# Patient Record
Sex: MALE | Race: WHITE | Employment: OTHER | URBAN - NONMETROPOLITAN AREA
[De-identification: names, ages, dates, MRNs, and addresses within clinical notes are randomized per-mention and may not be internally consistent; named-entity substitution may affect disease eponyms.]

---

## 2017-02-21 ENCOUNTER — TELEPHONE (OUTPATIENT)
Dept: NEUROLOGY | Age: 77
End: 2017-02-21

## 2017-03-15 ENCOUNTER — TELEPHONE (OUTPATIENT)
Dept: NEUROLOGY | Age: 77
End: 2017-03-15

## 2017-04-03 ENCOUNTER — OFFICE VISIT (OUTPATIENT)
Dept: NEUROLOGY | Age: 77
End: 2017-04-03
Payer: MEDICARE

## 2017-04-03 VITALS
RESPIRATION RATE: 16 BRPM | HEIGHT: 68 IN | SYSTOLIC BLOOD PRESSURE: 114 MMHG | WEIGHT: 151 LBS | DIASTOLIC BLOOD PRESSURE: 71 MMHG | HEART RATE: 60 BPM | BODY MASS INDEX: 22.88 KG/M2

## 2017-04-03 DIAGNOSIS — F02.818 LATE ONSET ALZHEIMER'S DISEASE WITH BEHAVIORAL DISTURBANCE (HCC): Primary | ICD-10-CM

## 2017-04-03 DIAGNOSIS — G30.1 LATE ONSET ALZHEIMER'S DISEASE WITH BEHAVIORAL DISTURBANCE (HCC): Primary | ICD-10-CM

## 2017-04-03 DIAGNOSIS — G20 PARKINSON DISEASE (HCC): ICD-10-CM

## 2017-04-03 DIAGNOSIS — M79.605 PAIN IN BOTH LOWER EXTREMITIES: ICD-10-CM

## 2017-04-03 DIAGNOSIS — M79.604 PAIN IN BOTH LOWER EXTREMITIES: ICD-10-CM

## 2017-04-03 PROCEDURE — 99214 OFFICE O/P EST MOD 30 MIN: CPT | Performed by: PSYCHIATRY & NEUROLOGY

## 2017-04-03 RX ORDER — PAROXETINE HYDROCHLORIDE 20 MG/1
20 TABLET, FILM COATED ORAL DAILY
Qty: 30 TABLET | Refills: 2 | Status: SHIPPED | OUTPATIENT
Start: 2017-04-03

## 2017-04-03 RX ORDER — FENTANYL 12 UG/H
1 PATCH TRANSDERMAL
COMMUNITY
End: 2018-05-08 | Stop reason: CLARIF

## 2017-04-17 RX ORDER — MEMANTINE HYDROCHLORIDE 10 MG/1
TABLET ORAL
Qty: 60 TABLET | Refills: 4 | Status: SHIPPED | OUTPATIENT
Start: 2017-04-17 | End: 2017-09-15 | Stop reason: SDUPTHER

## 2017-09-15 RX ORDER — MEMANTINE HYDROCHLORIDE 10 MG/1
TABLET ORAL
Qty: 60 TABLET | Refills: 11 | Status: SHIPPED | OUTPATIENT
Start: 2017-09-15 | End: 2018-10-15 | Stop reason: SDUPTHER

## 2018-01-28 ENCOUNTER — APPOINTMENT (OUTPATIENT)
Dept: CT IMAGING | Facility: HOSPITAL | Age: 78
End: 2018-01-28

## 2018-01-28 ENCOUNTER — APPOINTMENT (OUTPATIENT)
Dept: GENERAL RADIOLOGY | Facility: HOSPITAL | Age: 78
End: 2018-01-28

## 2018-01-28 ENCOUNTER — HOSPITAL ENCOUNTER (EMERGENCY)
Facility: HOSPITAL | Age: 78
Discharge: HOME | End: 2018-01-28
Attending: EMERGENCY MEDICINE | Admitting: EMERGENCY MEDICINE

## 2018-01-28 VITALS
SYSTOLIC BLOOD PRESSURE: 112 MMHG | HEART RATE: 90 BPM | BODY MASS INDEX: 22.73 KG/M2 | HEIGHT: 68 IN | OXYGEN SATURATION: 100 % | DIASTOLIC BLOOD PRESSURE: 70 MMHG | RESPIRATION RATE: 16 BRPM | WEIGHT: 150 LBS | TEMPERATURE: 98 F

## 2018-01-28 DIAGNOSIS — E86.0 DEHYDRATION: Primary | ICD-10-CM

## 2018-01-28 LAB
ALBUMIN SERPL-MCNC: 4.1 G/DL (ref 3.5–5)
ALBUMIN/GLOB SERPL: 1.4 G/DL (ref 1.1–2.5)
ALP SERPL-CCNC: 78 U/L (ref 24–120)
ALT SERPL W P-5'-P-CCNC: <15 U/L (ref 0–54)
ANION GAP SERPL CALCULATED.3IONS-SCNC: 12 MMOL/L (ref 4–13)
AST SERPL-CCNC: 19 U/L (ref 7–45)
BASOPHILS # BLD AUTO: 0.01 10*3/MM3 (ref 0–0.2)
BASOPHILS NFR BLD AUTO: 0.2 % (ref 0–2)
BILIRUB SERPL-MCNC: 0.5 MG/DL (ref 0.1–1)
BILIRUB UR QL STRIP: NEGATIVE
BUN BLD-MCNC: 23 MG/DL (ref 5–21)
BUN/CREAT SERPL: 16.9 (ref 7–25)
CALCIUM SPEC-SCNC: 9.4 MG/DL (ref 8.4–10.4)
CHLORIDE SERPL-SCNC: 105 MMOL/L (ref 98–110)
CLARITY UR: ABNORMAL
CO2 SERPL-SCNC: 28 MMOL/L (ref 24–31)
COLOR UR: YELLOW
CREAT BLD-MCNC: 1.36 MG/DL (ref 0.5–1.4)
D-LACTATE SERPL-SCNC: 1.8 MMOL/L (ref 0.5–2)
DEPRECATED RDW RBC AUTO: 44.7 FL (ref 40–54)
EOSINOPHIL # BLD AUTO: 0.04 10*3/MM3 (ref 0–0.7)
EOSINOPHIL NFR BLD AUTO: 0.7 % (ref 0–4)
ERYTHROCYTE [DISTWIDTH] IN BLOOD BY AUTOMATED COUNT: 12.6 % (ref 12–15)
GFR SERPL CREATININE-BSD FRML MDRD: 51 ML/MIN/1.73
GLOBULIN UR ELPH-MCNC: 3 GM/DL
GLUCOSE BLD-MCNC: 117 MG/DL (ref 70–100)
GLUCOSE UR STRIP-MCNC: NEGATIVE MG/DL
HCT VFR BLD AUTO: 39.5 % (ref 40–52)
HGB BLD-MCNC: 12.9 G/DL (ref 14–18)
HGB UR QL STRIP.AUTO: NEGATIVE
IMM GRANULOCYTES # BLD: 0.01 10*3/MM3 (ref 0–0.03)
IMM GRANULOCYTES NFR BLD: 0.2 % (ref 0–5)
INR PPP: 0.93 (ref 0.91–1.09)
KETONES UR QL STRIP: NEGATIVE
LEUKOCYTE ESTERASE UR QL STRIP.AUTO: NEGATIVE
LYMPHOCYTES # BLD AUTO: 0.59 10*3/MM3 (ref 0.72–4.86)
LYMPHOCYTES NFR BLD AUTO: 11 % (ref 15–45)
MCH RBC QN AUTO: 31.5 PG (ref 28–32)
MCHC RBC AUTO-ENTMCNC: 32.7 G/DL (ref 33–36)
MCV RBC AUTO: 96.3 FL (ref 82–95)
MONOCYTES # BLD AUTO: 0.45 10*3/MM3 (ref 0.19–1.3)
MONOCYTES NFR BLD AUTO: 8.4 % (ref 4–12)
NEUTROPHILS # BLD AUTO: 4.25 10*3/MM3 (ref 1.87–8.4)
NEUTROPHILS NFR BLD AUTO: 79.5 % (ref 39–78)
NITRITE UR QL STRIP: NEGATIVE
NRBC BLD MANUAL-RTO: 0 /100 WBC (ref 0–0)
PH UR STRIP.AUTO: <=5 [PH] (ref 5–8)
PLATELET # BLD AUTO: 130 10*3/MM3 (ref 130–400)
PMV BLD AUTO: 12 FL (ref 6–12)
POTASSIUM BLD-SCNC: 4.5 MMOL/L (ref 3.5–5.3)
PROT SERPL-MCNC: 7.1 G/DL (ref 6.3–8.7)
PROT UR QL STRIP: NEGATIVE
PROTHROMBIN TIME: 12.7 SECONDS (ref 11.9–14.6)
RBC # BLD AUTO: 4.1 10*6/MM3 (ref 4.8–5.9)
SODIUM BLD-SCNC: 145 MMOL/L (ref 135–145)
SP GR UR STRIP: 1.02 (ref 1–1.03)
TSH SERPL DL<=0.05 MIU/L-ACNC: 0.72 MIU/ML (ref 0.47–4.68)
UROBILINOGEN UR QL STRIP: ABNORMAL
WBC NRBC COR # BLD: 5.35 10*3/MM3 (ref 4.8–10.8)

## 2018-01-28 PROCEDURE — 70450 CT HEAD/BRAIN W/O DYE: CPT

## 2018-01-28 PROCEDURE — 85025 COMPLETE CBC W/AUTO DIFF WBC: CPT | Performed by: EMERGENCY MEDICINE

## 2018-01-28 PROCEDURE — 81003 URINALYSIS AUTO W/O SCOPE: CPT | Performed by: EMERGENCY MEDICINE

## 2018-01-28 PROCEDURE — 96360 HYDRATION IV INFUSION INIT: CPT

## 2018-01-28 PROCEDURE — 83605 ASSAY OF LACTIC ACID: CPT | Performed by: EMERGENCY MEDICINE

## 2018-01-28 PROCEDURE — 80053 COMPREHEN METABOLIC PANEL: CPT | Performed by: EMERGENCY MEDICINE

## 2018-01-28 PROCEDURE — 84443 ASSAY THYROID STIM HORMONE: CPT | Performed by: EMERGENCY MEDICINE

## 2018-01-28 PROCEDURE — 71045 X-RAY EXAM CHEST 1 VIEW: CPT

## 2018-01-28 PROCEDURE — 99284 EMERGENCY DEPT VISIT MOD MDM: CPT

## 2018-01-28 PROCEDURE — 85610 PROTHROMBIN TIME: CPT | Performed by: EMERGENCY MEDICINE

## 2018-01-28 RX ORDER — SODIUM CHLORIDE 0.9 % (FLUSH) 0.9 %
10 SYRINGE (ML) INJECTION AS NEEDED
Status: DISCONTINUED | OUTPATIENT
Start: 2018-01-28 | End: 2018-01-28 | Stop reason: HOSPADM

## 2018-01-28 RX ADMIN — SODIUM CHLORIDE 1000 ML: 9 INJECTION, SOLUTION INTRAVENOUS at 13:16

## 2018-04-19 ENCOUNTER — HOSPITAL ENCOUNTER (EMERGENCY)
Facility: HOSPITAL | Age: 78
Discharge: HOME | End: 2018-04-19
Attending: EMERGENCY MEDICINE | Admitting: EMERGENCY MEDICINE

## 2018-04-19 ENCOUNTER — APPOINTMENT (OUTPATIENT)
Dept: GENERAL RADIOLOGY | Facility: HOSPITAL | Age: 78
End: 2018-04-19

## 2018-04-19 VITALS
SYSTOLIC BLOOD PRESSURE: 136 MMHG | OXYGEN SATURATION: 100 % | TEMPERATURE: 98.4 F | DIASTOLIC BLOOD PRESSURE: 67 MMHG | RESPIRATION RATE: 18 BRPM | HEART RATE: 62 BPM

## 2018-04-19 DIAGNOSIS — R55 SYNCOPE AND COLLAPSE: Primary | ICD-10-CM

## 2018-04-19 LAB
ALBUMIN SERPL-MCNC: 3.9 G/DL (ref 3.5–5)
ALBUMIN/GLOB SERPL: 1.4 G/DL (ref 1.1–2.5)
ALP SERPL-CCNC: 90 U/L (ref 24–120)
ALT SERPL W P-5'-P-CCNC: 19 U/L (ref 0–54)
ANION GAP SERPL CALCULATED.3IONS-SCNC: 10 MMOL/L (ref 4–13)
AST SERPL-CCNC: 54 U/L (ref 7–45)
BASOPHILS # BLD AUTO: 0.03 10*3/MM3 (ref 0–0.2)
BASOPHILS NFR BLD AUTO: 0.3 % (ref 0–2)
BILIRUB SERPL-MCNC: 0.6 MG/DL (ref 0.1–1)
BUN BLD-MCNC: 26 MG/DL (ref 5–21)
BUN/CREAT SERPL: 18.6 (ref 7–25)
CALCIUM SPEC-SCNC: 9.2 MG/DL (ref 8.4–10.4)
CHLORIDE SERPL-SCNC: 104 MMOL/L (ref 98–110)
CO2 SERPL-SCNC: 27 MMOL/L (ref 24–31)
CREAT BLD-MCNC: 1.4 MG/DL (ref 0.5–1.4)
DEPRECATED RDW RBC AUTO: 43.5 FL (ref 40–54)
EOSINOPHIL # BLD AUTO: 0.03 10*3/MM3 (ref 0–0.7)
EOSINOPHIL NFR BLD AUTO: 0.3 % (ref 0–4)
ERYTHROCYTE [DISTWIDTH] IN BLOOD BY AUTOMATED COUNT: 12.4 % (ref 12–15)
GFR SERPL CREATININE-BSD FRML MDRD: 49 ML/MIN/1.73
GLOBULIN UR ELPH-MCNC: 2.8 GM/DL
GLUCOSE BLD-MCNC: 118 MG/DL (ref 70–100)
HCT VFR BLD AUTO: 39.8 % (ref 40–52)
HGB BLD-MCNC: 12.9 G/DL (ref 14–18)
HOLD SPECIMEN: NORMAL
HOLD SPECIMEN: NORMAL
INR PPP: 0.98 (ref 0.91–1.09)
LYMPHOCYTES # BLD AUTO: 0.36 10*3/MM3 (ref 0.72–4.86)
LYMPHOCYTES NFR BLD AUTO: 3.1 % (ref 15–45)
MCH RBC QN AUTO: 30.7 PG (ref 28–32)
MCHC RBC AUTO-ENTMCNC: 32.4 G/DL (ref 33–36)
MCV RBC AUTO: 94.8 FL (ref 82–95)
MONOCYTES # BLD AUTO: 0.69 10*3/MM3 (ref 0.19–1.3)
MONOCYTES NFR BLD AUTO: 5.9 % (ref 4–12)
NEUTROPHILS # BLD AUTO: 10.52 10*3/MM3 (ref 1.87–8.4)
NEUTROPHILS NFR BLD AUTO: 89.7 % (ref 39–78)
PLATELET # BLD AUTO: 134 10*3/MM3 (ref 130–400)
PMV BLD AUTO: 11.6 FL (ref 6–12)
POTASSIUM BLD-SCNC: 4.3 MMOL/L (ref 3.5–5.3)
PROT SERPL-MCNC: 6.7 G/DL (ref 6.3–8.7)
PROTHROMBIN TIME: 13.3 SECONDS (ref 11.9–14.6)
RBC # BLD AUTO: 4.2 10*6/MM3 (ref 4.8–5.9)
SODIUM BLD-SCNC: 141 MMOL/L (ref 135–145)
TROPONIN I SERPL-MCNC: <0.012 NG/ML (ref 0–0.03)
WBC NRBC COR # BLD: 11.71 10*3/MM3 (ref 4.8–10.8)
WHOLE BLOOD HOLD SPECIMEN: NORMAL
WHOLE BLOOD HOLD SPECIMEN: NORMAL

## 2018-04-19 PROCEDURE — 84484 ASSAY OF TROPONIN QUANT: CPT | Performed by: EMERGENCY MEDICINE

## 2018-04-19 PROCEDURE — 80053 COMPREHEN METABOLIC PANEL: CPT | Performed by: EMERGENCY MEDICINE

## 2018-04-19 PROCEDURE — 71046 X-RAY EXAM CHEST 2 VIEWS: CPT

## 2018-04-19 PROCEDURE — 93005 ELECTROCARDIOGRAM TRACING: CPT | Performed by: EMERGENCY MEDICINE

## 2018-04-19 PROCEDURE — 99285 EMERGENCY DEPT VISIT HI MDM: CPT

## 2018-04-19 PROCEDURE — 85025 COMPLETE CBC W/AUTO DIFF WBC: CPT | Performed by: EMERGENCY MEDICINE

## 2018-04-19 PROCEDURE — 93010 ELECTROCARDIOGRAM REPORT: CPT | Performed by: INTERNAL MEDICINE

## 2018-04-19 PROCEDURE — 85610 PROTHROMBIN TIME: CPT | Performed by: EMERGENCY MEDICINE

## 2018-04-19 RX ORDER — SODIUM CHLORIDE 0.9 % (FLUSH) 0.9 %
10 SYRINGE (ML) INJECTION AS NEEDED
Status: DISCONTINUED | OUTPATIENT
Start: 2018-04-19 | End: 2018-04-20 | Stop reason: HOSPADM

## 2018-04-19 RX ORDER — ASPIRIN 81 MG/1
324 TABLET, CHEWABLE ORAL ONCE
Status: DISCONTINUED | OUTPATIENT
Start: 2018-04-19 | End: 2018-04-19

## 2018-04-20 NOTE — ED PROVIDER NOTES
Subjective   History of Present Illness     77-year-old male brought in by his family for syncopizing earlier today.  The patient is accompanied by his wife and daughter.  They both state that the patient was walking in the house when he passed out and fell into the arms.  The patient did not hit the floor.  The patient denied his head.  This with the patient was are not not responsive for possibly 2 minutes.    The patient was recently seen by his PCP and taken off his blood pressure medication because he had been shown to be having low blood pressure.    She has a history of dementia so it is difficult to get details from the patient.  However both the wife and daughter states patient at is at his baseline and is acting appropriately and was not complaining of anything further.    Review of Systems   Constitutional: Negative for activity change, appetite change, fatigue and fever.   Respiratory: Negative for apnea, cough, choking, chest tightness, shortness of breath, wheezing and stridor.    Cardiovascular: Negative for chest pain, palpitations and leg swelling.   Gastrointestinal: Negative for abdominal distention and abdominal pain.   Musculoskeletal: Negative for arthralgias, back pain and gait problem.   Neurological: Negative for dizziness, tremors, seizures, syncope, facial asymmetry, speech difficulty, weakness, light-headedness, numbness and headaches.       Past Medical History:   Diagnosis Date   • Cancer     colon   • Colostomy in place    • Dementia    • Hernia, perineal    • Parkinson disease        No Known Allergies    Past Surgical History:   Procedure Laterality Date   • COLON SURGERY     • COLOSTOMY     • HERNIA REPAIR         History reviewed. No pertinent family history.    Social History     Social History   • Marital status:      Social History Main Topics   • Smoking status: Never Smoker   • Alcohol use No   • Drug use: No   • Sexual activity: Defer     Other Topics Concern   • Not  on file           Objective   Physical Exam   Constitutional: He is oriented to person, place, and time. He appears well-developed.   HENT:   Right Ear: External ear normal.   Left Ear: External ear normal.   Eyes: EOM are normal. Pupils are equal, round, and reactive to light.   Neck: Normal range of motion.   Cardiovascular: Normal rate, regular rhythm, normal heart sounds and intact distal pulses.  Exam reveals no gallop and no friction rub.    No murmur heard.  Pulmonary/Chest: Effort normal and breath sounds normal. No respiratory distress. He has no wheezes. He has no rales. He exhibits no tenderness.   Abdominal: Soft. Bowel sounds are normal. He exhibits no distension and no mass. There is no tenderness. There is no rebound and no guarding. No hernia.   Musculoskeletal: Normal range of motion.   Neurological: He is alert and oriented to person, place, and time. He has normal reflexes. He displays normal reflexes. No cranial nerve deficit. He exhibits normal muscle tone.   Vitals reviewed.      ECG 12 Lead    Date/Time: 4/19/2018 11:13 PM  Performed by: MARK MARSHLAL  Authorized by: MARK MARSHALL   Comments: This bradycardia, rate 48, no signs of acute ischemia               ED Course  ED Course   Comment By Time   I had a lengthy discussion with the patient and his wife and daughter.  I explained to them that the patient has a negative workup.  The patient has had no symptoms in the emergency room.  I offered to admit the patient, however they stated they wished to go home.  They stated they already texted there primary care doctor.  I stressed to them the importance of following up with their primary care doctor as soon as possible.  Tthey understand and agrees with plan Mark Marshall MD 04/19 9076   on discharge reasessement: patient is resting comfortably, tolerating po, abdomen is soft, neuro exam is wnl, pt denies cp or sob, has no murmur on exam and has a unremarkable EKG.  Malachi Valenzuela MD 04/19 4354                  [facility]    Final diagnoses:   Syncope and collapse            Malachi Valenzuela MD  04/19/18 7396       Malachi Valenzuela MD  05/02/18 0923

## 2018-05-08 ENCOUNTER — OFFICE VISIT (OUTPATIENT)
Dept: NEUROLOGY | Age: 78
End: 2018-05-08
Payer: MEDICARE

## 2018-05-08 VITALS — HEART RATE: 65 BPM | SYSTOLIC BLOOD PRESSURE: 125 MMHG | DIASTOLIC BLOOD PRESSURE: 76 MMHG

## 2018-05-08 DIAGNOSIS — M79.604 PAIN IN BOTH LOWER EXTREMITIES: ICD-10-CM

## 2018-05-08 DIAGNOSIS — M79.605 PAIN IN BOTH LOWER EXTREMITIES: ICD-10-CM

## 2018-05-08 DIAGNOSIS — G30.1 LATE ONSET ALZHEIMER'S DISEASE WITH BEHAVIORAL DISTURBANCE (HCC): Primary | ICD-10-CM

## 2018-05-08 DIAGNOSIS — R26.9 GAIT ABNORMALITY: ICD-10-CM

## 2018-05-08 DIAGNOSIS — R25.2 SPASTICITY: ICD-10-CM

## 2018-05-08 DIAGNOSIS — F02.818 LATE ONSET ALZHEIMER'S DISEASE WITH BEHAVIORAL DISTURBANCE (HCC): Primary | ICD-10-CM

## 2018-05-08 PROCEDURE — 99214 OFFICE O/P EST MOD 30 MIN: CPT | Performed by: PSYCHIATRY & NEUROLOGY

## 2018-05-08 RX ORDER — FLUOXETINE 10 MG/1
10 CAPSULE ORAL DAILY
COMMUNITY

## 2018-08-29 ENCOUNTER — TELEPHONE (OUTPATIENT)
Dept: NEUROLOGY | Age: 78
End: 2018-08-29

## 2018-10-12 ENCOUNTER — HOSPITAL ENCOUNTER (EMERGENCY)
Facility: HOSPITAL | Age: 78
Discharge: HOME | End: 2018-10-12
Admitting: EMERGENCY MEDICINE

## 2018-10-12 ENCOUNTER — APPOINTMENT (OUTPATIENT)
Dept: GENERAL RADIOLOGY | Facility: HOSPITAL | Age: 78
End: 2018-10-12

## 2018-10-12 VITALS
SYSTOLIC BLOOD PRESSURE: 151 MMHG | HEART RATE: 66 BPM | WEIGHT: 150 LBS | HEIGHT: 69 IN | DIASTOLIC BLOOD PRESSURE: 79 MMHG | BODY MASS INDEX: 22.22 KG/M2 | OXYGEN SATURATION: 97 % | TEMPERATURE: 97.5 F | RESPIRATION RATE: 18 BRPM

## 2018-10-12 DIAGNOSIS — T17.308A CHOKING, INITIAL ENCOUNTER: Primary | ICD-10-CM

## 2018-10-12 LAB
ALBUMIN SERPL-MCNC: 4.4 G/DL (ref 3.5–5)
ALBUMIN/GLOB SERPL: 1.2 G/DL (ref 1.1–2.5)
ALP SERPL-CCNC: 118 U/L (ref 24–120)
ALT SERPL W P-5'-P-CCNC: <15 U/L (ref 0–54)
ANION GAP SERPL CALCULATED.3IONS-SCNC: 11 MMOL/L (ref 4–13)
AST SERPL-CCNC: 25 U/L (ref 7–45)
BASOPHILS # BLD AUTO: 0.04 10*3/MM3 (ref 0–0.2)
BASOPHILS NFR BLD AUTO: 0.7 % (ref 0–2)
BILIRUB SERPL-MCNC: 0.5 MG/DL (ref 0.1–1)
BUN BLD-MCNC: 27 MG/DL (ref 5–21)
BUN/CREAT SERPL: 24.8 (ref 7–25)
CALCIUM SPEC-SCNC: 9.7 MG/DL (ref 8.4–10.4)
CHLORIDE SERPL-SCNC: 102 MMOL/L (ref 98–110)
CO2 SERPL-SCNC: 31 MMOL/L (ref 24–31)
CREAT BLD-MCNC: 1.09 MG/DL (ref 0.5–1.4)
DEPRECATED RDW RBC AUTO: 43.1 FL (ref 40–54)
EOSINOPHIL # BLD AUTO: 0.08 10*3/MM3 (ref 0–0.7)
EOSINOPHIL NFR BLD AUTO: 1.3 % (ref 0–4)
ERYTHROCYTE [DISTWIDTH] IN BLOOD BY AUTOMATED COUNT: 12.4 % (ref 12–15)
GFR SERPL CREATININE-BSD FRML MDRD: 65 ML/MIN/1.73
GLOBULIN UR ELPH-MCNC: 3.7 GM/DL
GLUCOSE BLD-MCNC: 122 MG/DL (ref 70–100)
HCT VFR BLD AUTO: 48.6 % (ref 40–52)
HGB BLD-MCNC: 15.9 G/DL (ref 14–18)
HOLD SPECIMEN: NORMAL
HOLD SPECIMEN: NORMAL
IMM GRANULOCYTES # BLD: 0.02 10*3/MM3 (ref 0–0.03)
IMM GRANULOCYTES NFR BLD: 0.3 % (ref 0–5)
LYMPHOCYTES # BLD AUTO: 0.74 10*3/MM3 (ref 0.72–4.86)
LYMPHOCYTES NFR BLD AUTO: 12.5 % (ref 15–45)
MCH RBC QN AUTO: 30.7 PG (ref 28–32)
MCHC RBC AUTO-ENTMCNC: 32.7 G/DL (ref 33–36)
MCV RBC AUTO: 93.8 FL (ref 82–95)
MONOCYTES # BLD AUTO: 0.43 10*3/MM3 (ref 0.19–1.3)
MONOCYTES NFR BLD AUTO: 7.2 % (ref 4–12)
NEUTROPHILS # BLD AUTO: 4.63 10*3/MM3 (ref 1.87–8.4)
NEUTROPHILS NFR BLD AUTO: 78 % (ref 39–78)
NRBC BLD MANUAL-RTO: 0 /100 WBC (ref 0–0)
PLATELET # BLD AUTO: 148 10*3/MM3 (ref 130–400)
PMV BLD AUTO: 11.7 FL (ref 6–12)
POTASSIUM BLD-SCNC: 4.6 MMOL/L (ref 3.5–5.3)
PROT SERPL-MCNC: 8.1 G/DL (ref 6.3–8.7)
RBC # BLD AUTO: 5.18 10*6/MM3 (ref 4.8–5.9)
SODIUM BLD-SCNC: 144 MMOL/L (ref 135–145)
WBC NRBC COR # BLD: 5.94 10*3/MM3 (ref 4.8–10.8)
WHOLE BLOOD HOLD SPECIMEN: NORMAL
WHOLE BLOOD HOLD SPECIMEN: NORMAL

## 2018-10-12 PROCEDURE — 85025 COMPLETE CBC W/AUTO DIFF WBC: CPT | Performed by: EMERGENCY MEDICINE

## 2018-10-12 PROCEDURE — 36415 COLL VENOUS BLD VENIPUNCTURE: CPT

## 2018-10-12 PROCEDURE — 80053 COMPREHEN METABOLIC PANEL: CPT | Performed by: EMERGENCY MEDICINE

## 2018-10-12 PROCEDURE — 71045 X-RAY EXAM CHEST 1 VIEW: CPT

## 2018-10-12 PROCEDURE — 99283 EMERGENCY DEPT VISIT LOW MDM: CPT

## 2018-10-12 RX ORDER — HYDROCODONE BITARTRATE AND ACETAMINOPHEN 10; 325 MG/1; MG/1
1 TABLET ORAL EVERY 6 HOURS PRN
COMMUNITY

## 2018-10-12 RX ORDER — LORAZEPAM 0.5 MG/1
0.5 TABLET ORAL EVERY 8 HOURS PRN
COMMUNITY

## 2018-10-12 RX ORDER — FLUOXETINE 10 MG/1
10 CAPSULE ORAL DAILY
COMMUNITY

## 2018-10-14 NOTE — ED PROVIDER NOTES
Subjective   History of Present Illness  78-year-old male with history of dementia presents after an episode of choking 3 hours prior to arrival.  Home health evaluated patient and after hearing the story the patient family described of him eating dinner which was pizza and a payday candy bar, he had an episode of choking which tears had began streaming down his face.  He has been breathing normally without coughing since.  They are concerned that he may have aspirated however.  Review of Systems   All other systems reviewed and are negative.      Past Medical History:   Diagnosis Date   • Cancer (CMS/HCC)     colon   • Colostomy in place (CMS/HCC)    • Dementia    • Hernia, perineal    • Parkinson disease (CMS/HCC)        No Known Allergies    Past Surgical History:   Procedure Laterality Date   • COLON SURGERY     • COLOSTOMY     • HERNIA REPAIR         History reviewed. No pertinent family history.    Social History     Social History   • Marital status:      Social History Main Topics   • Smoking status: Never Smoker   • Alcohol use No   • Drug use: No   • Sexual activity: Defer     Other Topics Concern   • Not on file           Objective   Physical Exam   Constitutional: He is oriented to person, place, and time. He appears well-developed and well-nourished.   HENT:   Head: Normocephalic and atraumatic.   Eyes: Pupils are equal, round, and reactive to light. EOM are normal.   Neck: Normal range of motion. Neck supple.   Cardiovascular: Normal rate and regular rhythm.    Pulmonary/Chest: Effort normal and breath sounds normal. No respiratory distress. He has no wheezes.   Abdominal: Soft. Bowel sounds are normal.   Musculoskeletal: Normal range of motion.   Lymphadenopathy:     He has no cervical adenopathy.   Neurological: He is alert and oriented to person, place, and time. No cranial nerve deficit. Coordination normal.   Skin: Skin is warm and dry.   Psychiatric: He has a normal mood and affect. His  behavior is normal.   Nursing note and vitals reviewed.      Procedures           ED Course                  MDM  Number of Diagnoses or Management Options  Choking, initial encounter: new and requires workup  Diagnosis management comments: Negative CXR, non-hypoxic, patient performed PO challenge witnessed by this provider without difficulty.  Family is comfortable for patient to return home        Amount and/or Complexity of Data Reviewed  Clinical lab tests: reviewed and ordered  Tests in the radiology section of CPT®: ordered and reviewed  Tests in the medicine section of CPT®: reviewed and ordered    Risk of Complications, Morbidity, and/or Mortality  Presenting problems: moderate  Diagnostic procedures: moderate  Management options: moderate    Patient Progress  Patient progress: stable        Final diagnoses:   Choking, initial encounter            Todd Cortez PA-C  10/14/18 4332

## 2018-10-15 RX ORDER — MEMANTINE HYDROCHLORIDE 10 MG/1
TABLET ORAL
Qty: 60 TABLET | Refills: 11 | Status: SHIPPED | OUTPATIENT
Start: 2018-10-15 | End: 2019-01-01 | Stop reason: SDUPTHER

## 2018-10-23 ENCOUNTER — APPOINTMENT (OUTPATIENT)
Dept: CT IMAGING | Facility: HOSPITAL | Age: 78
End: 2018-10-23

## 2018-10-23 ENCOUNTER — HOSPITAL ENCOUNTER (OUTPATIENT)
Facility: HOSPITAL | Age: 78
Setting detail: OBSERVATION
Discharge: HOME | End: 2018-10-24
Attending: EMERGENCY MEDICINE | Admitting: EMERGENCY MEDICINE

## 2018-10-23 ENCOUNTER — APPOINTMENT (OUTPATIENT)
Dept: GENERAL RADIOLOGY | Facility: HOSPITAL | Age: 78
End: 2018-10-23

## 2018-10-23 DIAGNOSIS — N39.0 URINARY TRACT INFECTION WITHOUT HEMATURIA, SITE UNSPECIFIED: Primary | ICD-10-CM

## 2018-10-23 LAB
ALBUMIN SERPL-MCNC: 4.3 G/DL (ref 3.5–5)
ALBUMIN/GLOB SERPL: 1.2 G/DL (ref 1.1–2.5)
ALP SERPL-CCNC: 94 U/L (ref 24–120)
ALT SERPL W P-5'-P-CCNC: 15 U/L (ref 0–54)
ANION GAP SERPL CALCULATED.3IONS-SCNC: 9 MMOL/L (ref 4–13)
AST SERPL-CCNC: 23 U/L (ref 7–45)
BACTERIA UR QL AUTO: ABNORMAL /HPF
BASOPHILS # BLD AUTO: 0.03 10*3/MM3 (ref 0–0.2)
BASOPHILS NFR BLD AUTO: 0.4 % (ref 0–2)
BILIRUB SERPL-MCNC: 0.4 MG/DL (ref 0.1–1)
BILIRUB UR QL STRIP: NEGATIVE
BUN BLD-MCNC: 22 MG/DL (ref 5–21)
BUN/CREAT SERPL: 17.7 (ref 7–25)
CALCIUM SPEC-SCNC: 9.7 MG/DL (ref 8.4–10.4)
CHLORIDE SERPL-SCNC: 99 MMOL/L (ref 98–110)
CLARITY UR: CLEAR
CO2 SERPL-SCNC: 32 MMOL/L (ref 24–31)
COLOR UR: ABNORMAL
CREAT BLD-MCNC: 1.24 MG/DL (ref 0.5–1.4)
D-LACTATE SERPL-SCNC: 1.5 MMOL/L (ref 0.5–2)
DEPRECATED RDW RBC AUTO: 41.8 FL (ref 40–54)
EOSINOPHIL # BLD AUTO: 0.03 10*3/MM3 (ref 0–0.7)
EOSINOPHIL NFR BLD AUTO: 0.4 % (ref 0–4)
ERYTHROCYTE [DISTWIDTH] IN BLOOD BY AUTOMATED COUNT: 12.1 % (ref 12–15)
GFR SERPL CREATININE-BSD FRML MDRD: 56 ML/MIN/1.73
GLOBULIN UR ELPH-MCNC: 3.5 GM/DL
GLUCOSE BLD-MCNC: 125 MG/DL (ref 70–100)
GLUCOSE UR STRIP-MCNC: NEGATIVE MG/DL
HCT VFR BLD AUTO: 44.7 % (ref 40–52)
HGB BLD-MCNC: 14.6 G/DL (ref 14–18)
HGB UR QL STRIP.AUTO: NEGATIVE
HOLD SPECIMEN: NORMAL
HYALINE CASTS UR QL AUTO: ABNORMAL /LPF
IMM GRANULOCYTES # BLD: 0.03 10*3/MM3 (ref 0–0.03)
IMM GRANULOCYTES NFR BLD: 0.4 % (ref 0–5)
KETONES UR QL STRIP: NEGATIVE
LEUKOCYTE ESTERASE UR QL STRIP.AUTO: ABNORMAL
LYMPHOCYTES # BLD AUTO: 0.72 10*3/MM3 (ref 0.72–4.86)
LYMPHOCYTES NFR BLD AUTO: 9 % (ref 15–45)
MCH RBC QN AUTO: 30.4 PG (ref 28–32)
MCHC RBC AUTO-ENTMCNC: 32.7 G/DL (ref 33–36)
MCV RBC AUTO: 93.1 FL (ref 82–95)
MONOCYTES # BLD AUTO: 0.58 10*3/MM3 (ref 0.19–1.3)
MONOCYTES NFR BLD AUTO: 7.2 % (ref 4–12)
NEUTROPHILS # BLD AUTO: 6.64 10*3/MM3 (ref 1.87–8.4)
NEUTROPHILS NFR BLD AUTO: 82.6 % (ref 39–78)
NITRITE UR QL STRIP: POSITIVE
NRBC BLD MANUAL-RTO: 0 /100 WBC (ref 0–0)
PH UR STRIP.AUTO: 6.5 [PH] (ref 5–8)
PLATELET # BLD AUTO: 147 10*3/MM3 (ref 130–400)
PMV BLD AUTO: 11.4 FL (ref 6–12)
POTASSIUM BLD-SCNC: 4.8 MMOL/L (ref 3.5–5.3)
PROT SERPL-MCNC: 7.8 G/DL (ref 6.3–8.7)
PROT UR QL STRIP: NEGATIVE
RBC # BLD AUTO: 4.8 10*6/MM3 (ref 4.8–5.9)
RBC # UR: ABNORMAL /HPF
REF LAB TEST METHOD: ABNORMAL
SODIUM BLD-SCNC: 140 MMOL/L (ref 135–145)
SP GR UR STRIP: 1.02 (ref 1–1.03)
SQUAMOUS #/AREA URNS HPF: ABNORMAL /HPF
UROBILINOGEN UR QL STRIP: ABNORMAL
WBC NRBC COR # BLD: 8.03 10*3/MM3 (ref 4.8–10.8)
WBC UR QL AUTO: ABNORMAL /HPF
WHOLE BLOOD HOLD SPECIMEN: NORMAL
WHOLE BLOOD HOLD SPECIMEN: NORMAL

## 2018-10-23 PROCEDURE — 85025 COMPLETE CBC W/AUTO DIFF WBC: CPT | Performed by: EMERGENCY MEDICINE

## 2018-10-23 PROCEDURE — 96361 HYDRATE IV INFUSION ADD-ON: CPT

## 2018-10-23 PROCEDURE — 96374 THER/PROPH/DIAG INJ IV PUSH: CPT

## 2018-10-23 PROCEDURE — 80053 COMPREHEN METABOLIC PANEL: CPT | Performed by: EMERGENCY MEDICINE

## 2018-10-23 PROCEDURE — G0378 HOSPITAL OBSERVATION PER HR: HCPCS

## 2018-10-23 PROCEDURE — 87086 URINE CULTURE/COLONY COUNT: CPT | Performed by: EMERGENCY MEDICINE

## 2018-10-23 PROCEDURE — 96372 THER/PROPH/DIAG INJ SC/IM: CPT

## 2018-10-23 PROCEDURE — 87040 BLOOD CULTURE FOR BACTERIA: CPT | Performed by: EMERGENCY MEDICINE

## 2018-10-23 PROCEDURE — 70450 CT HEAD/BRAIN W/O DYE: CPT

## 2018-10-23 PROCEDURE — 25010000002 CEFTRIAXONE PER 250 MG: Performed by: EMERGENCY MEDICINE

## 2018-10-23 PROCEDURE — 25010000002 ENOXAPARIN PER 10 MG: Performed by: FAMILY MEDICINE

## 2018-10-23 PROCEDURE — 83605 ASSAY OF LACTIC ACID: CPT | Performed by: EMERGENCY MEDICINE

## 2018-10-23 PROCEDURE — 99285 EMERGENCY DEPT VISIT HI MDM: CPT

## 2018-10-23 PROCEDURE — 71045 X-RAY EXAM CHEST 1 VIEW: CPT

## 2018-10-23 PROCEDURE — 81001 URINALYSIS AUTO W/SCOPE: CPT | Performed by: EMERGENCY MEDICINE

## 2018-10-23 RX ORDER — ACETAMINOPHEN 650 MG/1
650 SUPPOSITORY RECTAL EVERY 4 HOURS PRN
Status: DISCONTINUED | OUTPATIENT
Start: 2018-10-23 | End: 2018-10-24 | Stop reason: HOSPADM

## 2018-10-23 RX ORDER — LORAZEPAM 0.5 MG/1
0.5 TABLET ORAL EVERY 8 HOURS PRN
Status: DISCONTINUED | OUTPATIENT
Start: 2018-10-23 | End: 2018-10-24 | Stop reason: HOSPADM

## 2018-10-23 RX ORDER — DEXTROSE, SODIUM CHLORIDE, AND POTASSIUM CHLORIDE 5; .45; .15 G/100ML; G/100ML; G/100ML
100 INJECTION INTRAVENOUS CONTINUOUS
Status: DISCONTINUED | OUTPATIENT
Start: 2018-10-23 | End: 2018-10-24 | Stop reason: HOSPADM

## 2018-10-23 RX ORDER — SODIUM CHLORIDE 0.9 % (FLUSH) 0.9 %
3-10 SYRINGE (ML) INJECTION AS NEEDED
Status: DISCONTINUED | OUTPATIENT
Start: 2018-10-23 | End: 2018-10-24 | Stop reason: HOSPADM

## 2018-10-23 RX ORDER — ONDANSETRON 2 MG/ML
4 INJECTION INTRAMUSCULAR; INTRAVENOUS EVERY 6 HOURS PRN
Status: DISCONTINUED | OUTPATIENT
Start: 2018-10-23 | End: 2018-10-24 | Stop reason: HOSPADM

## 2018-10-23 RX ORDER — HYDROMORPHONE HYDROCHLORIDE 1 MG/ML
0.5 INJECTION, SOLUTION INTRAMUSCULAR; INTRAVENOUS; SUBCUTANEOUS
Status: DISCONTINUED | OUTPATIENT
Start: 2018-10-23 | End: 2018-10-24 | Stop reason: HOSPADM

## 2018-10-23 RX ORDER — HYDROCODONE BITARTRATE AND ACETAMINOPHEN 10; 325 MG/1; MG/1
1 TABLET ORAL EVERY 6 HOURS PRN
Status: DISCONTINUED | OUTPATIENT
Start: 2018-10-23 | End: 2018-10-24 | Stop reason: HOSPADM

## 2018-10-23 RX ORDER — SODIUM CHLORIDE 0.9 % (FLUSH) 0.9 %
3 SYRINGE (ML) INJECTION EVERY 12 HOURS SCHEDULED
Status: DISCONTINUED | OUTPATIENT
Start: 2018-10-23 | End: 2018-10-24 | Stop reason: HOSPADM

## 2018-10-23 RX ORDER — FLUOXETINE 10 MG/1
10 CAPSULE ORAL DAILY
Status: DISCONTINUED | OUTPATIENT
Start: 2018-10-24 | End: 2018-10-24 | Stop reason: HOSPADM

## 2018-10-23 RX ORDER — LORAZEPAM 1 MG/1
1 TABLET ORAL NIGHTLY
COMMUNITY

## 2018-10-23 RX ADMIN — CARBIDOPA AND LEVODOPA 1 TABLET: 25; 100 TABLET ORAL at 23:53

## 2018-10-23 RX ADMIN — ENOXAPARIN SODIUM 30 MG: 30 INJECTION SUBCUTANEOUS at 23:58

## 2018-10-23 RX ADMIN — WATER 10 MG: 1 INJECTION INTRAMUSCULAR; INTRAVENOUS; SUBCUTANEOUS at 23:59

## 2018-10-23 RX ADMIN — POTASSIUM CHLORIDE, DEXTROSE MONOHYDRATE AND SODIUM CHLORIDE 100 ML/HR: 150; 5; 450 INJECTION, SOLUTION INTRAVENOUS at 23:50

## 2018-10-23 RX ADMIN — CEFTRIAXONE SODIUM 1 G: 1 INJECTION, POWDER, FOR SOLUTION INTRAMUSCULAR; INTRAVENOUS at 21:28

## 2018-10-23 RX ADMIN — HYDROCODONE BITARTRATE AND ACETAMINOPHEN 1 TABLET: 10; 325 TABLET ORAL at 23:53

## 2018-10-24 VITALS
TEMPERATURE: 97.5 F | BODY MASS INDEX: 18.07 KG/M2 | HEIGHT: 68 IN | SYSTOLIC BLOOD PRESSURE: 123 MMHG | DIASTOLIC BLOOD PRESSURE: 72 MMHG | OXYGEN SATURATION: 96 % | WEIGHT: 119.2 LBS | HEART RATE: 56 BPM | RESPIRATION RATE: 16 BRPM

## 2018-10-24 LAB
ANION GAP SERPL CALCULATED.3IONS-SCNC: 9 MMOL/L (ref 4–13)
BASOPHILS # BLD AUTO: 0.02 10*3/MM3 (ref 0–0.2)
BASOPHILS NFR BLD AUTO: 0.3 % (ref 0–2)
BUN BLD-MCNC: 18 MG/DL (ref 5–21)
BUN/CREAT SERPL: 19.4 (ref 7–25)
CALCIUM SPEC-SCNC: 9 MG/DL (ref 8.4–10.4)
CHLORIDE SERPL-SCNC: 103 MMOL/L (ref 98–110)
CO2 SERPL-SCNC: 27 MMOL/L (ref 24–31)
CREAT BLD-MCNC: 0.93 MG/DL (ref 0.5–1.4)
DEPRECATED RDW RBC AUTO: 43 FL (ref 40–54)
EOSINOPHIL # BLD AUTO: 0.04 10*3/MM3 (ref 0–0.7)
EOSINOPHIL NFR BLD AUTO: 0.6 % (ref 0–4)
ERYTHROCYTE [DISTWIDTH] IN BLOOD BY AUTOMATED COUNT: 12.5 % (ref 12–15)
GFR SERPL CREATININE-BSD FRML MDRD: 78 ML/MIN/1.73
GLUCOSE BLD-MCNC: 136 MG/DL (ref 70–100)
HCT VFR BLD AUTO: 38.8 % (ref 40–52)
HGB BLD-MCNC: 12.7 G/DL (ref 14–18)
IMM GRANULOCYTES # BLD: 0.02 10*3/MM3 (ref 0–0.03)
IMM GRANULOCYTES NFR BLD: 0.3 % (ref 0–5)
LYMPHOCYTES # BLD AUTO: 0.8 10*3/MM3 (ref 0.72–4.86)
LYMPHOCYTES NFR BLD AUTO: 11.7 % (ref 15–45)
MCH RBC QN AUTO: 30.9 PG (ref 28–32)
MCHC RBC AUTO-ENTMCNC: 32.7 G/DL (ref 33–36)
MCV RBC AUTO: 94.4 FL (ref 82–95)
MONOCYTES # BLD AUTO: 0.61 10*3/MM3 (ref 0.19–1.3)
MONOCYTES NFR BLD AUTO: 8.9 % (ref 4–12)
NEUTROPHILS # BLD AUTO: 5.37 10*3/MM3 (ref 1.87–8.4)
NEUTROPHILS NFR BLD AUTO: 78.2 % (ref 39–78)
NRBC BLD MANUAL-RTO: 0 /100 WBC (ref 0–0)
PLATELET # BLD AUTO: 128 10*3/MM3 (ref 130–400)
PMV BLD AUTO: 11.5 FL (ref 6–12)
POTASSIUM BLD-SCNC: 4.1 MMOL/L (ref 3.5–5.3)
RBC # BLD AUTO: 4.11 10*6/MM3 (ref 4.8–5.9)
SODIUM BLD-SCNC: 139 MMOL/L (ref 135–145)
WBC NRBC COR # BLD: 6.86 10*3/MM3 (ref 4.8–10.8)

## 2018-10-24 PROCEDURE — 96372 THER/PROPH/DIAG INJ SC/IM: CPT

## 2018-10-24 PROCEDURE — 80048 BASIC METABOLIC PNL TOTAL CA: CPT | Performed by: FAMILY MEDICINE

## 2018-10-24 PROCEDURE — G0378 HOSPITAL OBSERVATION PER HR: HCPCS

## 2018-10-24 PROCEDURE — 96361 HYDRATE IV INFUSION ADD-ON: CPT

## 2018-10-24 PROCEDURE — 94760 N-INVAS EAR/PLS OXIMETRY 1: CPT

## 2018-10-24 PROCEDURE — 85025 COMPLETE CBC W/AUTO DIFF WBC: CPT | Performed by: FAMILY MEDICINE

## 2018-10-24 PROCEDURE — 94799 UNLISTED PULMONARY SVC/PX: CPT

## 2018-10-24 PROCEDURE — 25010000002 ZIPRASIDONE MESYLATE PER 10 MG: Performed by: NURSE PRACTITIONER

## 2018-10-24 RX ORDER — ZIPRASIDONE HYDROCHLORIDE 20 MG/1
20 CAPSULE ORAL DAILY
Qty: 3 CAPSULE | Refills: 0 | Status: SHIPPED | OUTPATIENT
Start: 2018-10-24

## 2018-10-24 RX ORDER — ZIPRASIDONE HYDROCHLORIDE 20 MG/1
20 CAPSULE ORAL 2 TIMES DAILY WITH MEALS
Qty: 6 CAPSULE | Refills: 0 | Status: SHIPPED | OUTPATIENT
Start: 2018-10-24 | End: 2018-10-24

## 2018-10-24 RX ORDER — CEFPROZIL 500 MG/1
500 TABLET, FILM COATED ORAL DAILY
Qty: 2 TABLET | Refills: 0 | Status: SHIPPED | OUTPATIENT
Start: 2018-10-24 | End: 2018-10-26

## 2018-10-24 RX ADMIN — POTASSIUM CHLORIDE, DEXTROSE MONOHYDRATE AND SODIUM CHLORIDE 100 ML/HR: 150; 5; 450 INJECTION, SOLUTION INTRAVENOUS at 09:11

## 2018-10-24 RX ADMIN — WATER 10 MG: 1 INJECTION INTRAMUSCULAR; INTRAVENOUS; SUBCUTANEOUS at 06:11

## 2018-10-24 RX ADMIN — Medication 3 ML: at 00:01

## 2018-10-24 RX ADMIN — WATER 10 MG: 1 INJECTION INTRAMUSCULAR; INTRAVENOUS; SUBCUTANEOUS at 13:12

## 2018-10-24 NOTE — ED PROVIDER NOTES
Subjective   Patient is an 78-year-old male with history of Parkinson disease and dementia who presents with concerns for more confusion and combativeness.  Patient arrives with family.  This is symptoms have been ongoing now several days.  They state he has had UTIs in the past and they would like his urine tested.  The deny any other symptoms.  No fevers, cough.  He does not seem to be in any pain.  He did take a pain pill at approximate 4 PM along with Ativan.  He is sleepy but they state this is how he has been frequently.        History limited by:  Dementia      Review of Systems   Unable to perform ROS: Dementia       Past Medical History:   Diagnosis Date   • Cancer (CMS/HCC)     colon   • Colostomy in place (CMS/[facility])    • Dementia    • Hernia, perineal    • Parkinson disease (CMS/HCC)        No Known Allergies    Past Surgical History:   Procedure Laterality Date   • COLON SURGERY     • COLOSTOMY     • HERNIA REPAIR         History reviewed. No pertinent family history.    Social History     Social History   • Marital status:      Social History Main Topics   • Smoking status: Never Smoker   • Alcohol use No   • Drug use: No   • Sexual activity: Defer     Other Topics Concern   • Not on file       Lab Results (last 24 hours)     Procedure Component Value Units Date/Time    Lactic Acid, Plasma [[id number]]  (Normal) Collected:  10/23/18 1925    Specimen:  Blood from Arm, Right Updated:  10/23/18 2006     Lactate 1.5 mmol/L     Blood Culture Bottle Set [[id number]] Collected:  10/23/18 1931    Specimen:  Blood from Arm, Left Updated:  10/23/18 2045     Extra Tube Hold for add-ons.     Comment: Auto resulted.       Comprehensive Metabolic Panel [[id number]]  (Abnormal) Collected:  10/23/18 1931    Specimen:  Blood Updated:  10/23/18 2004     Glucose 125 (H) mg/dL      BUN 22 (H) mg/dL      Creatinine 1.24 mg/dL      Sodium 140 mmol/L      Potassium 4.8 mmol/L      Chloride 99 mmol/L      CO2 32.0 (H)  mmol/L      Calcium 9.7 mg/dL      Total Protein 7.8 g/dL      Albumin 4.30 g/dL      ALT (SGPT) 15 U/L      AST (SGOT) 23 U/L      Alkaline Phosphatase 94 U/L      Total Bilirubin 0.4 mg/dL      eGFR Non African Amer 56 (L) mL/min/1.73      Globulin 3.5 gm/dL      A/G Ratio 1.2 g/dL      BUN/Creatinine Ratio 17.7     Anion Gap 9.0 mmol/L     Narrative:       The MDRD GFR formula is only valid for adults with stable renal function between ages 18 and 70.    CBC & Differential [[id number]] Collected:  10/23/18 1931    Specimen:  Blood Updated:  10/23/18 1952    Narrative:       The following orders were created for panel order CBC & Differential.  Procedure                               Abnormality         Status                     ---------                               -----------         ------                     CBC Auto Differential[[id number]]        Abnormal            Final result                 Please view results for these tests on the individual orders.    CBC Auto Differential [[id number]]  (Abnormal) Collected:  10/23/18 1931    Specimen:  Blood Updated:  10/23/18 1952     WBC 8.03 10*3/mm3      RBC 4.80 10*6/mm3      Hemoglobin 14.6 g/dL      Hematocrit 44.7 %      MCV 93.1 fL      MCH 30.4 pg      MCHC 32.7 (L) g/dL      RDW 12.1 %      RDW-SD 41.8 fl      MPV 11.4 fL      Platelets 147 10*3/mm3      Neutrophil % 82.6 (H) %      Lymphocyte % 9.0 (L) %      Monocyte % 7.2 %      Eosinophil % 0.4 %      Basophil % 0.4 %      Immature Grans % 0.4 %      Neutrophils, Absolute 6.64 10*3/mm3      Lymphocytes, Absolute 0.72 10*3/mm3      Monocytes, Absolute 0.58 10*3/mm3      Eosinophils, Absolute 0.03 10*3/mm3      Basophils, Absolute 0.03 10*3/mm3      Immature Grans, Absolute 0.03 10*3/mm3      nRBC 0.0 /100 WBC     Blood Culture - Blood, Blood, Venous Line [[id number]] Collected:  10/23/18 1932    Specimen:  Blood from Hand, Right Updated:  10/23/18 1950    Urinalysis With Culture If Indicated - Urine,  Catheter [[id number]]  (Abnormal) Collected:  10/23/18 1948    Specimen:  Urine from Urine, Catheter Updated:  10/23/18 2005     Color, UA Orange (A)     Appearance, UA Clear     pH, UA 6.5     Specific Gravity, UA 1.022     Glucose, UA Negative     Ketones, UA Negative     Bilirubin, UA Negative     Blood, UA Negative     Protein, UA Negative     Leuk Esterase, UA Small (1+) (A)     Nitrite, UA Positive (A)     Urobilinogen, UA 1.0 E.U./dL    Narrative:       Dipstick results may be inaccurate due to color interference.    Urinalysis, Microscopic Only - Urine, Clean Catch [[id number]]  (Abnormal) Collected:  10/23/18 1948    Specimen:  Urine from Urine, Catheter Updated:  10/23/18 2016     RBC, UA 0-2 (A) /HPF      WBC, UA 0-2 (A) /HPF      Bacteria, UA Trace (A) /HPF      Squamous Epithelial Cells, UA None Seen /HPF      Hyaline Casts, UA None Seen /LPF      Methodology Manual Light Microscopy          Objective   Physical Exam   Constitutional: Vital signs are normal. He is sleeping.  Non-toxic appearance. No distress.   Patient is somnolent but does wake up to voice   HENT:   Head: Atraumatic.   Mouth/Throat: Oropharynx is clear and moist.   Eyes: Pupils are equal, round, and reactive to light. EOM are normal. No scleral icterus.   Neck: Neck supple.   Cardiovascular: Normal rate and regular rhythm.  Exam reveals no gallop and no friction rub.    No murmur heard.  Pulmonary/Chest: Effort normal and breath sounds normal. No respiratory distress. He has no wheezes. He has no rales.   Abdominal: Soft. He exhibits no distension.   Musculoskeletal: He exhibits no tenderness.   Neurological: He is disoriented.   Disoriented but baseline dementia.  He does move all 4 extremities to stimuli.   Skin: Skin is warm and dry. Capillary refill takes less than 2 seconds. No rash noted.       Procedures         XR Chest 1 View   Final Result   No acute cardiopulmonary abnormality.   This report was finalized on 10/23/2018 20:44  "by Dr. Yan Gutierrez MD.      CT Head Without Contrast   Final Result   No acute intracranial abnormality. There are advanced   chronic findings associated with aging. Moderate ventricular dilatation   is present, this may be slightly greater than expected for the degree of   atrophy seen. Correlate clinically for potential NPH.       This report was finalized on 10/23/2018 20:19 by Dr. Yan Gutierrez MD.          /81   Pulse 62   Temp 98.8 °F (37.1 °C) (Temporal Artery )   Resp 18   Ht 172.7 cm (68\")   Wt 59 kg (130 lb)   SpO2 95%   BMI 19.77 kg/m²     ED Course    ED Course as of Oct 23 2139   Tue Oct 23, 2018   2116 Nitrite, UA: (!) Positive [TH]   2116 Leuk Esterase, UA: (!) Small (1+) [TH]   2116 This is an 78-year-old male who presents in setting of worsening altered mental status in the setting of UTI.  He does have baseline dementia.  His UA is positive for nitrites.  We will give ceftriaxone.  His vital signs are stable.  Afebrile.  He will be admitted to hospitalist given worsening confusion in setting of UTI.  [TH]      ED Course User Index  [TH] Foreign Gutierrez MD       Medications   cefTRIAXone (ROCEPHIN) 1 g/10mL IV PUSH syringe (1 g Intravenous Given 10/23/18 2128)            MDM  Number of Diagnoses or Management Options  Urinary tract infection without hematuria, site unspecified: new and requires workup     Amount and/or Complexity of Data Reviewed  Clinical lab tests: ordered and reviewed  Tests in the radiology section of CPT®: ordered and reviewed    Risk of Complications, Morbidity, and/or Mortality  Presenting problems: moderate  Diagnostic procedures: moderate  Management options: moderate    Patient Progress  Patient progress: stable      Final diagnoses:   Urinary tract infection without hematuria, site unspecified          Foreign Gutierrez MD  10/23/18 2139    "

## 2018-10-24 NOTE — PROGRESS NOTES
Continued Stay Note   Nu     Patient Name: Wes Lim  MRN: [id number]  Today's Date: 10/24/2018    Admit Date: 10/23/2018          Discharge Plan     Row Name 10/24/18 1411       Plan    Plan Home    Patient/Family in Agreement with Plan yes    Final Discharge Disposition Code 01 - home or self-care    Final Note Pt is being d/c'ed today. Faxed d/c summary to [facility] at [phone].              Discharge Codes    No documentation.       Expected Discharge Date and Time     Expected Discharge Date Expected Discharge Time    Oct 24, 2018             ROSE Bob

## 2018-10-24 NOTE — DISCHARGE PLACEMENT REQUEST
Gregoria Shashi [facility] [phone]       Discharge Summary      Keira Lai DO at 10/24/2018  1:40 PM              [facility] Medicine Services  DISCHARGE SUMMARY       Date of Admission: 10/23/2018  Date of Discharge:  10/24/2018  Primary Care Physician: Jh Sahni MD    Presenting Problem/History of Present Illness:  Urinary tract infection without hematuria, site unspecified [N39.0]     Final Discharge Diagnoses:  Dementia with behavioral disturbance  Parkinson's disease  Perineal hernia  Dysphagia  History of colon cancer    Consults: none    Procedures Performed: none    Pertinent Test Results:   136 (H) mg/dL       BUN 18 mg/dL     Creatinine 0.93 mg/dL     Sodium 139 mmol/L     Potassium 4.1 mmol/L     Chloride 103 mmol/L     CO2 27.0 mmol/L     Calcium 9.0 mg/dL     eGFR Non African Amer 78 mL/min/1.73     BUN/Creatinine Ratio 19.4    Anion Gap 9.0 mmol/L    Narrative:     The MDRD GFR formula is only valid for adults with stable renal function between ages 18 and 70.   CBC Auto Differential [[id number]] (Abnormal) Collected: 10/24/18 0712   Lab Status: Final result Specimen: Blood Updated: 10/24/18 0743    WBC 6.86 10*3/mm3     RBC 4.11 (L) 10*6/mm3     Hemoglobin 12.7 (L) g/dL     Hematocrit 38.8 (L) %     MCV 94.4 fL     MCH 30.9 pg     MCHC 32.7 (L) g/dL     RDW 12.5 %     RDW-SD 43.0 fl     MPV 11.5 fL     Platelets 128 (L) 10*3/mm3     Neutrophil % 78.2 (H) %     Lymphocyte % 11.7 (L) %     Monocyte % 8.9 %     Eosinophil % 0.6 %     Basophil % 0.3 %     Immature Grans % 0.3 %     Neutrophils, Absolute 5.37 10*3/mm3     Lymphocytes, Absolute 0.80 10*3/mm3     Monocytes, Absolute 0.61 10*3/mm3     Eosinophils, Absolute 0.04 10*3/mm3     Basophils, Absolute 0.02 10*3/mm3     Immature Grans, Absolute 0.02 10*3/mm3     nRBC 0.0 /100 WBC    Urinalysis With Culture If Indicated - Urine, Catheter [[id number]] (Abnormal) Collected: 10/23/18 1948   Lab Status: Final  result Specimen: Urine from Urine, Catheter Updated: 10/23/18 2005    Color, UA Orange (A)    Appearance, UA Clear    pH, UA 6.5    Specific Gravity, UA 1.022    Glucose, UA Negative    Ketones, UA Negative    Bilirubin, UA Negative    Blood, UA Negative    Protein, UA Negative    Leuk Esterase, UA Small (1+) (A)    Nitrite, UA Positive (A)    Urobilinogen, UA 1.0 E.U./dL   Narrative:     Dipstick results may be inaccurate due to color interference.   Urinalysis, Microscopic Only - Urine, Clean Catch [[id number]] (Abnormal) Collected: 10/23/18 1948   Lab Status: Final result Specimen: Urine from Urine, Catheter Updated: 10/23/18 2016    RBC, UA 0-2 (A) /HPF     WBC, UA 0-2 (A) /HPF     Bacteria, UA Trace (A) /HPF     Squamous Epithelial Cells, UA None Seen /HPF     Hyaline Casts, UA None Seen /LPF     Methodology Manual Light Microscopy   Urine Culture - Urine, Urine, Catheter In/Out [[id number]] (Normal) Collected: 10/23/18 1948   Lab Status: Preliminary result Specimen: Urine from Urine, Catheter In/Out Updated: 10/24/18 0639    Urine Culture No growth at 24 hours   Blood Culture - Blood, Blood, Venous Line [[id number]] (Normal) Collected: 10/23/18 1932   Lab Status: Preliminary result Specimen: Blood from Hand, Right Updated: 10/24/18 0800    Blood Culture No growth at less than 24 hours   [place] Draw [[id number]] Collected: 10/23/18 1931   Lab Status: Final result Specimen: Blood Updated: 10/23/18 2045   Narrative:     The following orders were created for panel order [place] Draw.  Procedure                               Abnormality         Status                     ---------                               -----------         ------                     Light Blue Top[[id number]]                                   Final result               Green Top (Gel)[[id number]]                                  Final result               Lavender Top[[id number]]                                     Final result               Red  Top[[id number]]                                          Final result               Blood Culture Bottle Set[[id number]]                         Final result                 Please view results for these tests on the individual orders.   Comprehensive Metabolic Panel [[id number]] (Abnormal) Collected: 10/23/18 1931   Lab Status: Final result Specimen: Blood Updated: 10/23/18 2004    Glucose 125 (H) mg/dL     BUN 22 (H) mg/dL     Creatinine 1.24 mg/dL     Sodium 140 mmol/L     Potassium 4.8 mmol/L     Chloride 99 mmol/L     CO2 32.0 (H) mmol/L     Calcium 9.7 mg/dL     Total Protein 7.8 g/dL     Albumin 4.30 g/dL     ALT (SGPT) 15 U/L     AST (SGOT) 23 U/L     Alkaline Phosphatase 94 U/L     Total Bilirubin 0.4 mg/dL     eGFR Non African Amer 56 (L) mL/min/1.73     Globulin 3.5 gm/dL     A/G Ratio 1.2 g/dL     BUN/Creatinine Ratio 17.7    Anion Gap 9.0 mmol/L    Narrative:     The MDRD GFR formula is only valid for adults with stable renal function between ages 18 and 70.   CBC Auto Differential [[id number]] (Abnormal) Collected: 10/23/18 1931   Lab Status: Final result Specimen: Blood Updated: 10/23/18 1952    WBC 8.03 10*3/mm3     RBC 4.80 10*6/mm3     Hemoglobin 14.6 g/dL     Hematocrit 44.7 %     MCV 93.1 fL     MCH 30.4 pg     MCHC 32.7 (L) g/dL     RDW 12.1 %     RDW-SD 41.8 fl     MPV 11.4 fL     Platelets 147 10*3/mm3     Neutrophil % 82.6 (H) %     Lymphocyte % 9.0 (L) %     Monocyte % 7.2 %     Eosinophil % 0.4 %     Basophil % 0.4 %     Immature Grans % 0.4 %     Neutrophils, Absolute 6.64 10*3/mm3     Lymphocytes, Absolute 0.72 10*3/mm3     Monocytes, Absolute 0.58 10*3/mm3     Eosinophils, Absolute 0.03 10*3/mm3     Basophils, Absolute 0.03 10*3/mm3     Immature Grans, Absolute 0.03 10*3/mm3     nRBC 0.0 /100 WBC    Lactic Acid, Plasma [[id number]] (Normal) Collected: 10/23/18 1925   Lab Status: Final result Specimen: Blood from Arm, Right Updated: 10/23/18 2006    Lactate 1.5 mmol/L        Chief Complaint on  "Day of Discharge: Patient unable to give.     History of Present Illness on Day of Discharge: resting quietly in bed.     Hospital Course:  The patient is a 78 y.o. male who presented to [facility] with increased combativeness.  He was admitted with UTI.  Given Rocephin and IVF.  He also had geodon IM.  His initial culture shows no growth at 24 hours.  His UA was reviewed and it does state there is trace bacteria.   He will discharge home with family as that is their wish.  No institutionalization.      Condition on Discharge:  stable    Physical Exam on Discharge:  /72 (BP Location: Right arm, Patient Position: Lying)   Pulse 56   Temp 97.5 °F (36.4 °C) (Oral)   Resp 16   Ht 172.7 cm (68\")   Wt 54.1 kg (119 lb 3.2 oz)   SpO2 96%   BMI 18.12 kg/m²    Physical Exam   Constitutional: He appears well-developed and well-nourished.   HENT:   Head: Normocephalic and atraumatic.   Eyes: Pupils are equal, round, and reactive to light. Conjunctivae are normal.   Neck: Normal range of motion. Neck supple. No JVD present.   Cardiovascular: Normal rate, regular rhythm, normal heart sounds and intact distal pulses.    Pulmonary/Chest: Effort normal and breath sounds normal.   Abdominal: Soft. Bowel sounds are normal.   Neurological:   Sleeping    Skin: Skin is warm and dry.   Psychiatric:   Sleeping    Nursing note and vitals reviewed.        Discharge Disposition:  Home or Self Care    Discharge Medications:     Discharge Medications      New Medications      Instructions Start Date   cefprozil 500 MG tablet  Commonly known as:  CEFZIL   500 mg, Oral, Daily      ziprasidone 20 MG capsule  Commonly known as:  GEODON   20 mg, Oral, Daily         Continue These Medications      Instructions Start Date   carbidopa-levodopa  MG per tablet  Commonly known as:  SINEMET   Oral, Daily      FLUoxetine 10 MG capsule  Commonly known as:  PROzac   10 mg, Oral, Daily      HYDROcodone-acetaminophen  MG per " tablet  Commonly known as:  NORCO   1 tablet, Oral, Every 6 Hours PRN      LORazepam 0.5 MG tablet  Commonly known as:  ATIVAN   0.5 mg, Oral, Every 8 Hours PRN      LORazepam 1 MG tablet  Commonly known as:  ATIVAN   1 mg, Oral, Nightly             Discharge Diet:   Diet Instructions     Diet: Soft Texture; Thin Liquids, No Restrictions; Ground       Discharge Diet:  Soft Texture    Fluid Consistency:  Thin Liquids, No Restrictions    Soft Options:  Ground          Activity at Discharge:   Activity Instructions     Activity as Tolerated             Discharge Care Plan/Instructions:  Discussed with family geropsych unit in [place], currently not interested.  Family wish to have some geodon to go home with and want to discuss with PCP potentially having all the time.     Follow-up Appointments:   5-7 days with Dr Bora    Test Results Pending at Discharge: Urine culture final.     Keira Lai DO  10/24/18  1:40 PM    Time: 35 minutes          Electronically signed by Keira Lai DO at 10/24/2018  1:50 PM

## 2018-10-24 NOTE — H&P
[facility] Medicine Services  HISTORY AND PHYSICAL    Date of Admission: 10/23/2018  Primary Care Physician: Jh Sahni MD    Subjective     Chief Complaint: Dementia patient with worsening confusion    History of Present Illness  Ryland Lim is an 78-year-old  male with past medical history of dementia, colon cancer with colostomy, perianal hernia status post rectal closure and Parkinson's disease.  Patient lives at home with his wife.  He has a complete bedbound/wheelchair bound patient with chronic confusion.  Over the past few days he has had worsening confusion and has become combative.  Primary care provider Dr. Jh Sahni was contacted and due to patient being given Azo and was toed she would not be able to obtain a true urinalysis until Friday.  Patient was brought to emergency department for evaluation due to worsening condition.  Urinalysis does show mild UTI however if a so] interferes with results patient will be started on appropriate treatment.  Daughter states wife is against any type of discussion regarding placement.  Patient is quiet throughout evaluation, he was not arousable.  History of present illness obtained from daughter.    Review of Systems   Unable to determine due to confusion    Past Medical History:   Past Medical History:   Diagnosis Date   • Cancer (CMS/HCC)     colon   • Colostomy in place (CMS/HCC)    • Dementia    • Hernia, perineal    • Parkinson disease (CMS/HCC)        Past Surgical History:   Past Surgical History:   Procedure Laterality Date   • COLON SURGERY     • COLOSTOMY     • HERNIA REPAIR         Family History: family history includes No Known Problems in his father and mother.    Social History:  reports that he has never smoked. He does not have any smokeless tobacco history on file. He reports that he does not drink alcohol or use drugs.    Code Status: Full, wife will speak for him      Allergies:  No Known  "Allergies    Medications:  Prior to Admission medications    Medication Sig Start Date End Date Taking? Authorizing Provider   carbidopa-levodopa (SINEMET)  MG per tablet Take 1 tablet by mouth 3 (Three) Times a Day.    Emeka Fonseca MD   FLUoxetine (PROzac) 10 MG capsule Take 10 mg by mouth Daily.    Emeka Fonseca MD   HYDROcodone-acetaminophen (NORCO)  MG per tablet Take 1 tablet by mouth Every 6 (Six) Hours As Needed for Moderate Pain .    Emeka Fonseca MD   LORazepam (ATIVAN) 0.5 MG tablet Take 0.5 mg by mouth Every 8 (Eight) Hours As Needed for Anxiety.    Emeka Fonseca MD       Objective     /77   Pulse 66   Temp 98.8 °F (37.1 °C) (Temporal Artery )   Resp 18   Ht 172.7 cm (68\")   Wt 59 kg (130 lb)   SpO2 96%   BMI 19.77 kg/m²      Physical Exam   Constitutional: He appears well-developed and well-nourished. No distress.   HENT:   Head: Normocephalic and atraumatic.   Eyes: Conjunctivae are normal. No scleral icterus.   Neck: Neck supple. No JVD present. No tracheal deviation present.   Cardiovascular: Normal rate, regular rhythm, normal heart sounds and intact distal pulses.  Exam reveals no gallop.    No murmur heard.  Pulmonary/Chest: Effort normal and breath sounds normal. No respiratory distress. He has no wheezes. He has no rales.   Abdominal: Soft. Bowel sounds are normal. He exhibits no distension. There is no tenderness. There is no guarding.   Musculoskeletal: He exhibits no edema.   Neurological: He exhibits abnormal muscle tone (left foot drop).   unresponsive   Skin: Skin is warm and dry. No rash noted. He is not diaphoretic. No erythema. No pallor.   Vitals reviewed.      Pertinent Data:   Lab Results (last 72 hours)     Procedure Component Value Units Date/Time    Urinalysis, Microscopic Only - Urine, Clean Catch [[id number]]  (Abnormal) Collected:  10/23/18 1948    Specimen:  Urine from Urine, Catheter Updated:  10/23/18 2016     RBC, UA " 0-2 (A) /HPF      WBC, UA 0-2 (A) /HPF      Bacteria, UA Trace (A) /HPF      Squamous Epithelial Cells, UA None Seen /HPF      Hyaline Casts, UA None Seen /LPF      Methodology Manual Light Microscopy    Lactic Acid, Plasma [[id number]]  (Normal) Collected:  10/23/18 1925    Specimen:  Blood from Arm, Right Updated:  10/23/18 2006     Lactate 1.5 mmol/L     Urinalysis With Culture If Indicated - Urine, Catheter [[id number]]  (Abnormal) Collected:  10/23/18 1948    Specimen:  Urine from Urine, Catheter Updated:  10/23/18 2005     Color, UA Orange (A)     Appearance, UA Clear     pH, UA 6.5     Specific Gravity, UA 1.022     Glucose, UA Negative     Ketones, UA Negative     Bilirubin, UA Negative     Blood, UA Negative     Protein, UA Negative     Leuk Esterase, UA Small (1+) (A)     Nitrite, UA Positive (A)     Urobilinogen, UA 1.0 E.U./dL    Narrative:       Dipstick results may be inaccurate due to color interference.    Comprehensive Metabolic Panel [[id number]]  (Abnormal) Collected:  10/23/18 1931    Specimen:  Blood Updated:  10/23/18 2004     Glucose 125 (H) mg/dL      BUN 22 (H) mg/dL      Creatinine 1.24 mg/dL      Sodium 140 mmol/L      Potassium 4.8 mmol/L      Chloride 99 mmol/L      CO2 32.0 (H) mmol/L      Calcium 9.7 mg/dL      Total Protein 7.8 g/dL      Albumin 4.30 g/dL      ALT (SGPT) 15 U/L      AST (SGOT) 23 U/L      Alkaline Phosphatase 94 U/L      Total Bilirubin 0.4 mg/dL      eGFR Non African Amer 56 (L) mL/min/1.73      Globulin 3.5 gm/dL      A/G Ratio 1.2 g/dL      BUN/Creatinine Ratio 17.7     Anion Gap 9.0 mmol/L     CBC Auto Differential [[id number]]  (Abnormal) Collected:  10/23/18 1931    Specimen:  Blood Updated:  10/23/18 1952     WBC 8.03 10*3/mm3      RBC 4.80 10*6/mm3      Hemoglobin 14.6 g/dL      Hematocrit 44.7 %      MCV 93.1 fL      MCH 30.4 pg      MCHC 32.7 (L) g/dL      RDW 12.1 %      RDW-SD 41.8 fl      MPV 11.4 fL      Platelets 147 10*3/mm3      Neutrophil % 82.6 (H) %       Lymphocyte % 9.0 (L) %      Monocyte % 7.2 %      Eosinophil % 0.4 %      Basophil % 0.4 %      Immature Grans % 0.4 %      Neutrophils, Absolute 6.64 10*3/mm3      Lymphocytes, Absolute 0.72 10*3/mm3      Monocytes, Absolute 0.58 10*3/mm3      Eosinophils, Absolute 0.03 10*3/mm3      Basophils, Absolute 0.03 10*3/mm3      Immature Grans, Absolute 0.03 10*3/mm3      nRBC 0.0 /100 WBC     Blood Culture - Blood, Blood, Venous Line [[id number]] Collected:  10/23/18 1932    Specimen:  Blood from Hand, Right Updated:  10/23/18 1950        Imaging Results (last 24 hours)     Procedure Component Value Units Date/Time    XR Chest 1 View [[id number]] Collected:  10/23/18 2043     Updated:  10/23/18 2047    Narrative:       EXAMINATION: XR CHEST 1 VW- 10/23/2018 8:43 PM CDT     HISTORY: Confusion, acute encephalopathy.     REPORT: Comparison is made with the chest x-ray 10/12/2018.     The lungs are clear well-expanded. No pneumothorax or effusion is  identified. Heart size is normal. There is moderate ectasia of the  thoracic aorta. No acute osseous abnormality is seen.       Impression:       No acute cardiopulmonary abnormality.  This report was finalized on 10/23/2018 20:44 by Dr. Yan Gutierrez MD.    CT Head Without Contrast [[id number]] Collected:  10/23/18 2016     Updated:  10/23/18 2022    Narrative:       EXAMINATION: CT HEAD WO CONTRAST- 10/23/2018 8:16 PM CDT     HISTORY: Confusion/delirium, altered LOC, unexplained.     DOSE: 593 mGycm (Automatic exposure control technique was implemented in  an effort to keep the radiation dose as low as possible without  compromising image quality)     REPORT: Axial CT of the head was performed without contrast,  reconstructed coronal and sagittal images are also reviewed.     Comparison: CT head without contrast 1/28/2018.     No intracranial hemorrhage, mass or mass effect is identified. There is  moderate ventricular dilation, as before. There is moderate  diffuse  atrophy. There is extensive decreased attenuation in the periventricular  white matter tracts compatible with chronic small vessel white matter  ischemic disease. The extracranial structures appear within normal  limits.       Impression:       No acute intracranial abnormality. There are advanced  chronic findings associated with aging. Moderate ventricular dilatation  is present, this may be slightly greater than expected for the degree of  atrophy seen. Correlate clinically for potential NPH.     This report was finalized on 10/23/2018 20:19 by Dr. Yan Gutierrez MD.          I have personally reviewed and interpreted the radiology studies and ECG obtained at time of admission.     Assessment / Plan     Assessment:   Urinary tract infection without hematuria  Dementia  Parkinson's disease  Perineal hernia secondary to rectal closure  Dysphagia    Plan:   1.  Admit as observation  2.  Start Rocephin  3.  Urine culture pending  4.  Hydrate with D5 1/2 normal saline with potassium at 100 ML/hour   5.  Nothing by mouth per her now  6.  Consult speech therapies to evaluate dysphagia  7.  DVT prophylaxis with low-dose Lovenox    Addendum 1047 call from EVELYN Nava patient failed swallowing study in the emergency department, daughters distressed and feel he may need to go home as they feel this is undressed.  I did discuss with the daughter the complications of feeding him when seen earlier tonight, she verbalizes understanding of potential for aspiration, pneumonia and even death.  I did asked nurse to encourage him to stay, provide patient with crushed medications in applesauce and allow speech therapy to evaluate for recommendation of diet moving forward.  Geodon 10 mg every 6 hours IM and Dilaudid 0.5 mg IV every 2 hours added as needed.    I discussed the patient's findings and my recommendations with: Marjan Peguero MD  Time spent: 40 minutes  I personally evaluated and examined the patient in conjunction  with Joi BERTRAND and agree with the assessment, treatment plan, and disposition of the patient as recorded by her. Plans were made and discussed with Joi.  MD Marjan Nicholas MD  10/24/18  5:13 AM    ELZA Stinson  10/23/18   10:11 PM

## 2018-10-24 NOTE — PLAN OF CARE
Problem: Patient Care Overview  Goal: Plan of Care Review  Outcome: Ongoing (interventions implemented as appropriate)   10/24/18 0022   Coping/Psychosocial   Plan of Care Reviewed With patient;daughter   Plan of Care Review   Progress no change   OTHER   Outcome Summary Pt. admit from ED with UTI; IVF initiated; ostomy c/d/i; noted bruising on arms, legs, and buttocks, family states he is combative at home, denies falls; pt. currently resting comfortably; will monitor.     Goal: Individualization and Mutuality  Outcome: Ongoing (interventions implemented as appropriate)    Goal: Discharge Needs Assessment  Outcome: Ongoing (interventions implemented as appropriate)    Goal: Interprofessional Rounds/Family Conf  Outcome: Ongoing (interventions implemented as appropriate)      Problem: Fall Risk (Adult)  Goal: Identify Related Risk Factors and Signs and Symptoms  Outcome: Outcome(s) achieved Date Met: 10/24/18    Goal: Absence of Fall  Outcome: Ongoing (interventions implemented as appropriate)      Problem: Urinary Tract Infection (Adult)  Goal: Signs and Symptoms of Listed Potential Problems Will be Absent, Minimized or Managed (Urinary Tract Infection)  Outcome: Ongoing (interventions implemented as appropriate)      Problem: Skin Injury Risk (Adult)  Goal: Identify Related Risk Factors and Signs and Symptoms  Outcome: Outcome(s) achieved Date Met: 10/24/18    Goal: Skin Health and Integrity  Outcome: Ongoing (interventions implemented as appropriate)

## 2018-10-24 NOTE — PROGRESS NOTES
Malnutrition Severity Assessment    Patient Name:  Wes Lim  YOB: 1940  MRN: [id number]  Admit Date:  10/23/2018    Patient meets criteria for : Mild malnutrition    Comments:  If in agreement with documentation, please attest.  Thank you.    Malnutrition Type: Chronic Illness Malnutrition     Malnutrition Type (last 8 hours)      Malnutrition Severity Assessment     Row Name 10/24/18 1751       Malnutrition Severity Assessment    Malnutrition Type Chronic Illness Malnutrition    Row Name 10/24/18 1751       Physical Signs of Malnutrition (Chronic)    Muscle Wasting Mild    Fat Loss Mild    Fluid Accumulation None    Secondary Physical Signs None    Row Name 10/24/18 1751       Weight Status (Chronic)    BMI Mild (<19)    %IBW Mod <80%    Row Name 10/24/18 1751       Energy Intake Status (Chronic)    Energy Intake Mild (<75% / 5d)    Row Name 10/24/18 1751       Criteria Met (Must meet criteria for severity in at least 2 of these categories: M Wasting, Fat Loss, Fluid, Secondary Signs, Wt. Status, Intake)    Patient meets criteria for  Mild malnutrition          Electronically signed by:  Abbey Mendiola  10/24/18 5:53 PM

## 2018-10-24 NOTE — PLAN OF CARE
Problem: Patient Care Overview  Goal: Plan of Care Review  Outcome: Ongoing (interventions implemented as appropriate)   10/24/18 0960   Coping/Psychosocial   Plan of Care Reviewed With other (see comments)  (RN, Daria)   OTHER   Outcome Summary ST spoke with RN this date. Family refusing ST services at this time per RN. ST to d/c from speech services at this time. MD to reconsult if change or new concern.

## 2018-10-24 NOTE — PROGRESS NOTES
Discharge Planning Assessment  [facility]     Patient Name: Wes Lim  MRN: [id number]  Today's Date: 10/24/2018    Admit Date: 10/23/2018          Discharge Needs Assessment     Row Name 10/24/18 1154       Living Environment    Lives With spouse    Current Living Arrangements home/apartment/condo    Primary Care Provided by spouse/significant other;child(jeremías)    Provides Primary Care For no one, unable/limited ability to care for self    Family Caregiver if Needed child(jeremías), adult;spouse    Quality of Family Relationships helpful;involved;supportive    Able to Return to Prior Arrangements yes       Transition Planning    Patient/Family Anticipates Transition to home with help/services;home with family    Patient/Family Anticipated Services at Transition ;home health care    Transportation Anticipated family or friend will provide       Discharge Needs Assessment    Readmission Within the Last 30 Days no previous admission in last 30 days    Equipment Currently Used at Home wheelchair    Anticipated Changes Related to Illness none    Outpatient/Agency/Support Group Needs homecare agency    Discharge Facility/Level of Care Needs home with home health    Current Discharge Risk chronically ill;physical impairment;cognitively impaired    Discharge Coordination/Progress Pt lives at home with his spouse. He does have services through [facility], one visit a week. Pt's family takes care of pt 24/7 and plans to resume at d/c. Spoke with pt's daughter and she denies needs. Will notify PCM at d/c.             Discharge Plan    No documentation.       Destination     No service coordination in this encounter.      Durable Medical Equipment     No service coordination in this encounter.      Dialysis/Infusion     No service coordination in this encounter.      Home Medical Care     No service coordination in this encounter.      Social Care     No service coordination in this encounter.                 Demographic Summary    No documentation.           Functional Status    No documentation.           Psychosocial    No documentation.           Abuse/Neglect    No documentation.           Legal    No documentation.           Substance Abuse    No documentation.           Patient Forms    No documentation.         ROSE Bob

## 2018-10-24 NOTE — DISCHARGE SUMMARY
[facility]  DISCHARGE SUMMARY       Date of Admission: 10/23/2018  Date of Discharge:  10/24/2018  Primary Care Physician: Jh Sahni MD    Presenting Problem/History of Present Illness:  Urinary tract infection without hematuria, site unspecified [N39.0]     Final Discharge Diagnoses:  Dementia with behavioral disturbance  Parkinson's disease  Perineal hernia  Dysphagia  History of colon cancer    Consults: none    Procedures Performed: none    Pertinent Test Results:   136 (H) mg/dL       BUN 18 mg/dL     Creatinine 0.93 mg/dL     Sodium 139 mmol/L     Potassium 4.1 mmol/L     Chloride 103 mmol/L     CO2 27.0 mmol/L     Calcium 9.0 mg/dL     eGFR Non African Amer 78 mL/min/1.73     BUN/Creatinine Ratio 19.4    Anion Gap 9.0 mmol/L    Narrative:     The MDRD GFR formula is only valid for adults with stable renal function between ages 18 and 70.   CBC Auto Differential [[id number]] (Abnormal) Collected: 10/24/18 0712   Lab Status: Final result Specimen: Blood Updated: 10/24/18 0743    WBC 6.86 10*3/mm3     RBC 4.11 (L) 10*6/mm3     Hemoglobin 12.7 (L) g/dL     Hematocrit 38.8 (L) %     MCV 94.4 fL     MCH 30.9 pg     MCHC 32.7 (L) g/dL     RDW 12.5 %     RDW-SD 43.0 fl     MPV 11.5 fL     Platelets 128 (L) 10*3/mm3     Neutrophil % 78.2 (H) %     Lymphocyte % 11.7 (L) %     Monocyte % 8.9 %     Eosinophil % 0.6 %     Basophil % 0.3 %     Immature Grans % 0.3 %     Neutrophils, Absolute 5.37 10*3/mm3     Lymphocytes, Absolute 0.80 10*3/mm3     Monocytes, Absolute 0.61 10*3/mm3     Eosinophils, Absolute 0.04 10*3/mm3     Basophils, Absolute 0.02 10*3/mm3     Immature Grans, Absolute 0.02 10*3/mm3     nRBC 0.0 /100 WBC    Urinalysis With Culture If Indicated - Urine, Catheter [[id number]] (Abnormal) Collected: 10/23/18 1948   Lab Status: Final result Specimen: Urine from Urine, Catheter Updated: 10/23/18 2005    Color, UA Orange (A)    Appearance, UA Clear     pH, UA 6.5    Specific Gravity, UA 1.022    Glucose, UA Negative    Ketones, UA Negative    Bilirubin, UA Negative    Blood, UA Negative    Protein, UA Negative    Leuk Esterase, UA Small (1+) (A)    Nitrite, UA Positive (A)    Urobilinogen, UA 1.0 E.U./dL   Narrative:     Dipstick results may be inaccurate due to color interference.   Urinalysis, Microscopic Only - Urine, Clean Catch [[id number]] (Abnormal) Collected: 10/23/18 1948   Lab Status: Final result Specimen: Urine from Urine, Catheter Updated: 10/23/18 2016    RBC, UA 0-2 (A) /HPF     WBC, UA 0-2 (A) /HPF     Bacteria, UA Trace (A) /HPF     Squamous Epithelial Cells, UA None Seen /HPF     Hyaline Casts, UA None Seen /LPF     Methodology Manual Light Microscopy   Urine Culture - Urine, Urine, Catheter In/Out [[id number]] (Normal) Collected: 10/23/18 1948   Lab Status: Preliminary result Specimen: Urine from Urine, Catheter In/Out Updated: 10/24/18 0639    Urine Culture No growth at 24 hours   Blood Culture - Blood, Blood, Venous Line [[id number]] (Normal) Collected: 10/23/18 1932   Lab Status: Preliminary result Specimen: Blood from Hand, Right Updated: 10/24/18 0800    Blood Culture No growth at less than 24 hours   [place] Draw [[id number]] Collected: 10/23/18 1931   Lab Status: Final result Specimen: Blood Updated: 10/23/18 2045   Narrative:     The following orders were created for panel order [place] Draw.  Procedure                               Abnormality         Status                     ---------                               -----------         ------                     Light Blue Top[[id number]]                                   Final result               Green Top (Gel)[[id number]]                                  Final result               Lavender Top[[id number]]                                     Final result               Red Top[[id number]]                                          Final result               Blood Culture Bottle  Set[[id number]]                         Final result                 Please view results for these tests on the individual orders.   Comprehensive Metabolic Panel [[id number]] (Abnormal) Collected: 10/23/18 1931   Lab Status: Final result Specimen: Blood Updated: 10/23/18 2004    Glucose 125 (H) mg/dL     BUN 22 (H) mg/dL     Creatinine 1.24 mg/dL     Sodium 140 mmol/L     Potassium 4.8 mmol/L     Chloride 99 mmol/L     CO2 32.0 (H) mmol/L     Calcium 9.7 mg/dL     Total Protein 7.8 g/dL     Albumin 4.30 g/dL     ALT (SGPT) 15 U/L     AST (SGOT) 23 U/L     Alkaline Phosphatase 94 U/L     Total Bilirubin 0.4 mg/dL     eGFR Non African Amer 56 (L) mL/min/1.73     Globulin 3.5 gm/dL     A/G Ratio 1.2 g/dL     BUN/Creatinine Ratio 17.7    Anion Gap 9.0 mmol/L    Narrative:     The MDRD GFR formula is only valid for adults with stable renal function between ages 18 and 70.   CBC Auto Differential [[id number]] (Abnormal) Collected: 10/23/18 1931   Lab Status: Final result Specimen: Blood Updated: 10/23/18 1952    WBC 8.03 10*3/mm3     RBC 4.80 10*6/mm3     Hemoglobin 14.6 g/dL     Hematocrit 44.7 %     MCV 93.1 fL     MCH 30.4 pg     MCHC 32.7 (L) g/dL     RDW 12.1 %     RDW-SD 41.8 fl     MPV 11.4 fL     Platelets 147 10*3/mm3     Neutrophil % 82.6 (H) %     Lymphocyte % 9.0 (L) %     Monocyte % 7.2 %     Eosinophil % 0.4 %     Basophil % 0.4 %     Immature Grans % 0.4 %     Neutrophils, Absolute 6.64 10*3/mm3     Lymphocytes, Absolute 0.72 10*3/mm3     Monocytes, Absolute 0.58 10*3/mm3     Eosinophils, Absolute 0.03 10*3/mm3     Basophils, Absolute 0.03 10*3/mm3     Immature Grans, Absolute 0.03 10*3/mm3     nRBC 0.0 /100 WBC    Lactic Acid, Plasma [[id number]] (Normal) Collected: 10/23/18 1925   Lab Status: Final result Specimen: Blood from Arm, Right Updated: 10/23/18 2006    Lactate 1.5 mmol/L        Chief Complaint on Day of Discharge: Patient unable to give.     History of Present Illness on Day of Discharge: resting  "quietly in bed.     Hospital Course:  The patient is a 78 y.o. male who presented to [facility] with increased combativeness.  He was admitted with UTI.  Given Rocephin and IVF.  He also had geodon IM.  His initial culture shows no growth at 24 hours.  His UA was reviewed and it does state there is trace bacteria.   He will discharge home with family as that is their wish.  No institutionalization.      Condition on Discharge:  stable    Physical Exam on Discharge:  /72 (BP Location: Right arm, Patient Position: Lying)   Pulse 56   Temp 97.5 °F (36.4 °C) (Oral)   Resp 16   Ht 172.7 cm (68\")   Wt 54.1 kg (119 lb 3.2 oz)   SpO2 96%   BMI 18.12 kg/m²   Physical Exam   Constitutional: He appears well-developed and well-nourished.   HENT:   Head: Normocephalic and atraumatic.   Eyes: Pupils are equal, round, and reactive to light. Conjunctivae are normal.   Neck: Normal range of motion. Neck supple. No JVD present.   Cardiovascular: Normal rate, regular rhythm, normal heart sounds and intact distal pulses.    Pulmonary/Chest: Effort normal and breath sounds normal.   Abdominal: Soft. Bowel sounds are normal.   Neurological:   Sleeping    Skin: Skin is warm and dry.   Psychiatric:   Sleeping    Nursing note and vitals reviewed.        Discharge Disposition:  Home or Self Care    Discharge Medications:     Discharge Medications      New Medications      Instructions Start Date   cefprozil 500 MG tablet  Commonly known as:  CEFZIL   500 mg, Oral, Daily      ziprasidone 20 MG capsule  Commonly known as:  GEODON   20 mg, Oral, Daily         Continue These Medications      Instructions Start Date   carbidopa-levodopa  MG per tablet  Commonly known as:  SINEMET   Oral, Daily      FLUoxetine 10 MG capsule  Commonly known as:  PROzac   10 mg, Oral, Daily      HYDROcodone-acetaminophen  MG per tablet  Commonly known as:  NORCO   1 tablet, Oral, Every 6 Hours PRN      LORazepam 0.5 MG " tablet  Commonly known as:  ATIVAN   0.5 mg, Oral, Every 8 Hours PRN      LORazepam 1 MG tablet  Commonly known as:  ATIVAN   1 mg, Oral, Nightly             Discharge Diet:   Diet Instructions     Diet: Soft Texture; Thin Liquids, No Restrictions; Ground       Discharge Diet:  Soft Texture    Fluid Consistency:  Thin Liquids, No Restrictions    Soft Options:  Ground          Activity at Discharge:   Activity Instructions     Activity as Tolerated             Discharge Care Plan/Instructions:  [facility] [place]  Family wish to have some geodon to go home with and want to discuss with PCP potentially having all the time.     Follow-up Appointments:   5-7 days with Dr Sahni    Test Results Pending at Discharge: Urine culture final.     Keira Lai DO  10/24/18  1:40 PM    Time: 35 minutes

## 2018-10-25 ENCOUNTER — NURSE TRIAGE (OUTPATIENT)
Dept: CALL CENTER | Facility: HOSPITAL | Age: 78
End: 2018-10-25

## 2018-10-25 LAB — BACTERIA SPEC AEROBE CULT: NORMAL

## 2018-10-25 NOTE — TELEPHONE ENCOUNTER
"Caller is daughter. Father recently discharged from hospital and was prescribed Geodon.  Dr Lai, hospitalist, prescribed the medication.  Father has developed a cough that started last night, daughter has done some reading, and a cough can be a side effect of this medication and she would like to discuss with Dr Lai. Daughter denies father having fever or other symptoms of sickness.  She feels the Geodon has helped her father and it seems to be working well. Hospitalist program explained to daughter.  Advised to follow up with PCP, Dr Sahni.     Reason for Disposition  • Caller has NON-URGENT medication question about med that PCP prescribed and triager unable to answer question    Additional Information  • Negative: Drug overdose and nurse unable to answer question  • Negative: Caller requesting information not related to medicine  • Negative: Caller requesting a prescription for Strep throat and has a positive culture result  • Negative: Rash while taking a medication or within 3 days of stopping it  • Negative: Immunization reaction suspected  • Negative: [1] Asthma and [2] having symptoms of asthma (cough, wheezing, etc)  • Negative: MORE THAN A DOUBLE DOSE of a prescription or over-the-counter (OTC) drug  • Negative: [1] DOUBLE DOSE (an extra dose or lesser amount) of over-the-counter (OTC) drug AND [2] any symptoms (e.g., dizziness, nausea, pain, sleepiness)  • Negative: [1] DOUBLE DOSE (an extra dose or lesser amount) of prescription drug AND [2] any symptoms (e.g., dizziness, nausea, pain, sleepiness)  • Negative: Took another person's prescription drug  • Negative: [1] DOUBLE DOSE (an extra dose or lesser amount) of prescription drug AND [2] NO symptoms (Exception: a double dose of antibiotics)  • Negative: Diabetes drug error or overdose (e.g., insulin or extra dose)  • Negative: [1] Request for URGENT new prescription or refill of \"essential\" medication (i.e., likelihood of harm to patient if not taken) " "AND [2] triager unable to fill per unit policy  • Negative: [1] Prescription not at pharmacy AND [2] was prescribed today by PCP  • Negative: Pharmacy calling with prescription questions and triager unable to answer question  • Negative: Caller has URGENT medication question about med that PCP prescribed and triager unable to answer question    Answer Assessment - Initial Assessment Questions  1. SYMPTOMS: \"Do you have any symptoms?\"      cough  2. SEVERITY: If symptoms are present, ask \"Are they mild, moderate or severe?\"      mild    Protocols used: MEDICATION QUESTION CALL-ADULT-      "

## 2018-10-28 LAB — BACTERIA SPEC AEROBE CULT: NORMAL

## 2019-01-01 ENCOUNTER — HOSPITAL ENCOUNTER (OUTPATIENT)
Dept: WOUND CARE | Age: 79
Discharge: HOME | End: 2019-02-18
Payer: COMMERCIAL

## 2019-01-01 ENCOUNTER — HOSPITAL ENCOUNTER (OUTPATIENT)
Dept: WOUND CARE | Age: 79
Discharge: HOME | End: 2019-03-25
Payer: COMMERCIAL

## 2019-01-01 VITALS
BODY MASS INDEX: 18.04 KG/M2 | HEART RATE: 58 BPM | DIASTOLIC BLOOD PRESSURE: 62 MMHG | RESPIRATION RATE: 18 BRPM | HEIGHT: 68 IN | SYSTOLIC BLOOD PRESSURE: 131 MMHG | TEMPERATURE: 98.8 F | WEIGHT: 119 LBS

## 2019-01-01 VITALS
TEMPERATURE: 97.9 F | SYSTOLIC BLOOD PRESSURE: 132 MMHG | DIASTOLIC BLOOD PRESSURE: 72 MMHG | HEIGHT: 68 IN | BODY MASS INDEX: 18.04 KG/M2 | HEART RATE: 58 BPM | WEIGHT: 119 LBS | RESPIRATION RATE: 18 BRPM

## 2019-01-01 DIAGNOSIS — S31.000A WOUND OF SACRAL REGION, INITIAL ENCOUNTER: ICD-10-CM

## 2019-01-01 DIAGNOSIS — S31.000A WOUND OF SACRAL REGION, INITIAL ENCOUNTER: Chronic | ICD-10-CM

## 2019-01-01 PROCEDURE — 99203 OFFICE O/P NEW LOW 30 MIN: CPT | Performed by: SURGERY

## 2019-01-01 PROCEDURE — 97597 DBRDMT OPN WND 1ST 20 CM/<: CPT

## 2019-01-01 PROCEDURE — 99213 OFFICE O/P EST LOW 20 MIN: CPT

## 2019-01-01 PROCEDURE — 97597 DBRDMT OPN WND 1ST 20 CM/<: CPT | Performed by: SURGERY

## 2019-01-01 RX ORDER — ZIPRASIDONE HYDROCHLORIDE 20 MG/1
20 CAPSULE ORAL DAILY
COMMUNITY

## 2019-01-01 RX ORDER — MEMANTINE HYDROCHLORIDE 10 MG/1
TABLET ORAL
Qty: 60 TABLET | Refills: 11 | Status: SHIPPED | OUTPATIENT
Start: 2019-01-01

## 2019-01-01 RX ORDER — LORAZEPAM 0.5 MG/1
0.5 TABLET ORAL 3 TIMES DAILY
COMMUNITY

## 2019-01-01 ASSESSMENT — PAIN SCALES - GENERAL: PAINLEVEL_OUTOF10: 0

## 2019-02-18 PROBLEM — S31.000A WOUND OF SACRAL REGION: Chronic | Status: ACTIVE | Noted: 2019-01-01

## 2019-11-12 ENCOUNTER — TRANSCRIBE ORDERS (OUTPATIENT)
Dept: ADMINISTRATIVE | Facility: HOSPITAL | Age: 79
End: 2019-11-12

## 2019-11-12 DIAGNOSIS — T17.928A ASPIRATION OF FOOD, INITIAL ENCOUNTER: Primary | ICD-10-CM

## 2019-11-19 ENCOUNTER — TRANSCRIBE ORDERS (OUTPATIENT)
Dept: SPEECH THERAPY | Facility: HOSPITAL | Age: 79
End: 2019-11-19

## 2019-11-19 ENCOUNTER — APPOINTMENT (OUTPATIENT)
Dept: GENERAL RADIOLOGY | Facility: HOSPITAL | Age: 79
End: 2019-11-19

## 2019-11-19 ENCOUNTER — EVALUATION (OUTPATIENT)
Dept: SPEECH THERAPY | Facility: HOSPITAL | Age: 79
End: 2019-11-19

## 2019-11-19 ENCOUNTER — HOSPITAL ENCOUNTER (OUTPATIENT)
Dept: GENERAL RADIOLOGY | Facility: HOSPITAL | Age: 79
End: 2019-11-19

## 2019-11-19 DIAGNOSIS — R13.10 DYSPHAGIA, UNSPECIFIED TYPE: Primary | ICD-10-CM

## 2019-11-19 PROCEDURE — 92610 EVALUATE SWALLOWING FUNCTION: CPT

## 2019-11-19 NOTE — THERAPY DISCHARGE NOTE
Outpatient Speech Language Pathology   Adult Swallow Initial Eval/Discharge       Patient Name: Wes Lim  : 1940  MRN: [id number]  Today's Date: 2019         Visit Date: 2019     Clinical bedside swallow evaluation completed on outpatient basis given family concerns with decreased PO intake, overt s/s of aspiration with PO. Pt lethargic at time of eval. Pt was initially scheduled for a VFSS, yet secondary to pt's inability to awaken to safely present PO trials, decision was made per SLP and family to hold off on VFSS at this time. They were educated that given reported concerns of limited PO intake, excessive coughing, multiple swallows, and wet vocal quality with intake, SLP feels the pt is at a high risk for aspiration. They were offered for SLP and present Radiology technicians to attempt to awaken the pt to present PO trials, yet family refused such offer, given significant lethargy and inability to follow commands at baseline. They were educated that reported concerns lead SLP to suggest notifying the pt's MD for further recommendations considering that if pt was discharged home, he wouldn't likely meet nutritional needs and concerns regarding this were reviewed. They were presented possible scenarios of route that may be taken from a nutritional standpoint if MD desired pt to be admitted to the facility. They were opposed to consideration of ELÍAS, and verbalized desire for pt to return home. They were educated that ST cannot ensure safety with PO intake at this time and that if aspiration occurs, pneumonia, respiratory failure, and even death could occur. They verbalized understanding. They were educated that, considering that no ELÍAS are desired at this time, the safest PO diet at home would be that of a pureed diet consistency with honey thick liquids, again knowing the risks for aspiration. The daughter stated that they do thicken the pt's liquids at home, yet was uncertain of the  "consistency and did eventually say that the pt's liquids have been more consistent with that of a nectar thick consistency. It was encouraged that a \"runny pureed\" be provided, as opposed to dry, significantly thick pureed trials, as the pt likely presents with severe if not profound pharyngeal weakness, which could then result in difficulty with residue clearance if the pureed is not that of a runny pureed consistency. Proper oral care techniques were also reviewed, which included frequent swabbing of the oral cavity with a water moistened toothette, brushing the pt's teeth with a dry or dampened toothbrush and toothpaste and then swabbing out excess residue. They were provided multiple toothettes, which can be utilized at home for oral care. It was recommended that the family notify the pt's MD, Dr. Jh Sahni, in regards to current concerns to further determine best plan of care for the pt at this time, considering that aggressive nutritional methods are not desired at this time. Following the evaluation, the above concerns were reviewed via phone with Dr. Jh Sahni and with Dr. Sahni' nurse.     RECOMMENDATIONS:   1. NPO with PEG if aggressive measures were desired, yet considering that family refused consideration of ELÍAS today, safest PO diet of that of a pureed diet consistency diet with honey thick liquids, knowing that ST cannot ensure safety.  2. Upright posture with any PO intake.  3. Small bites, small sips.  4. Ensure pt has swallowed prior to presenting additional trials.   5. If pt shows increased overt s/s of aspiration with PO intake, hold PO, allow pt to recover and can attempt PO at a later time.   6. MD to order home health SLP services for supportive dysphagia care.   7. MD to continue to encourage palliative measures and/or hospice as appropriate, as MD stated family has previously refused such recommendations per MD.   Thanks! Albina Hansen, CCC-SLP 11/19/2019 4:20 PM      Patient Active " Problem List   Diagnosis   • Urinary tract infection without hematuria        Past Medical History:   Diagnosis Date   • Cancer (CMS/HCC)     colon   • Colostomy in place (CMS/[facility])    • Dementia (CMS/[facility])    • Hernia, perineal    • Parkinson disease (CMS/[facility])         Past Surgical History:   Procedure Laterality Date   • COLON SURGERY     • COLOSTOMY     • HERNIA REPAIR           Visit Dx:     ICD-10-CM ICD-9-CM   1. Dysphagia, unspecified type R13.10 787.20           SLP Adult Swallow Evaluation     Row Name 11/19/19 1500       Rehab Evaluation    Document Type  evaluation  -TM    Subjective Information  -- Lethargic   -TM    Patient Observations  lethargic  -TM    Patient Effort  poor  -TM       General Information    Patient Profile Reviewed  yes  -TM    Pertinent History Of Current Problem  Parkinson's disease, dementia, dysphagia. Daughter and spouse reported ongoing, yet progressive concerns with dysphagia and concerns with aspiration, with report of open mouth posture during the swallow, multiple swallows, wet vocal quality post PO intake, and frequent, excessive coughing with intake. Family also reported concern of significant decrease in PO intake since 11/16/2019, though somewhat inconsistent in report.   -TM    Current Method of Nutrition  soft textures;nectar/syrup-thick liquids  -TM    Precautions/Limitations, Vision  other (see comments) Unable to determine, did not open eyes  -TM    Precautions/Limitations, Hearing  other (see comments) Unable to determine   -TM    Prior Level of Function-Communication  cognitive-linguistic impairment;other (see comments) Dementia  -TM    Prior Level of Function-Swallowing  no diet consistency restrictions  -TM    Plans/Goals Discussed with  spouse/S.O.;family  -TM    Barriers to Rehab  medically complex;previous functional deficit;cognitive status  -TM    Patient's Goals for Discharge  patient could not state  -TM    Family Goals for Discharge  patient able to  eat/drink without coughing/choking  -TM       Pain Assessment    Additional Documentation  Pain Scale: FACES Pre/Post-Treatment (Group)  -TM       Pain Scale: FACES Pre/Post-Treatment    Pain: FACES Scale, Pretreatment  0-->no hurt  -TM    Pain: FACES Scale, Post-Treatment  0-->no hurt  -TM       Oral Motor and Function    Dentition Assessment  other (see comments) Natural dentition on bottom, unable to view top dentition  -TM    Secretion Management  dried secretions in oral cavity  -TM    Mucosal Quality  dry;cracked  -TM    Volitional Swallow  unable to elicit;other (see comments) Lethargic  -TM    Volitional Cough  other (see comments) Lethargic, unable to follow commands  -TM       Oral Musculature and Cranial Nerve Assessment    Oral Motor General Assessment  other (see comments) Unable to determine, suspect significant oral weakness  -TM    Vocal Impairment, Detail. Cranial Nerve X (Vagus)  other (see comments) CNA  -TM       General Eating/Swallowing Observations    Respiratory Support Currently in Use  room air  -TM    Consistencies Trialed  -- No PO trials presented at this time  -TM       Respiratory    Respiratory Status  WFL  -TM       Clinical Swallow Eval    Oral Prep Phase  -- CNA  -TM    Oral Transit  -- CNA  -TM    Oral Residue  -- CNA  -TM    Pharyngeal Phase  suspected pharyngeal impairment  -TM    Esophageal Phase  -- CNA  -TM    Clinical Swallow Evaluation Summary  Clinical bedside swallow evaluation completed on outpatient basis given family concerns with decreased PO intake, overt s/s of aspiration with PO. Pt lethargic at time of eval. Pt was initially scheduled for a VFSS, yet secondary to pt's inability to awaken to safely present PO trials, decision was made per SLP and family to hold off on VFSS at this time. They were educated that given reported concerns of limited PO intake, excessive coughing, multiple swallows, and wet vocal quality with intake, SLP feels the pt is at a high risk  "for aspiration. They were offered for SLP and present Radiology technicians to attempt to awaken the pt to present PO trials, yet family refused such offer, given significant lethargy and inability to follow commands at baseline. They were educated that reported concerns lead SLP to suggest notifying the pt's MD for further recommendations considering that if pt was discharged home, he wouldn't likely meet nutritional needs and concerns regarding this were reviewed. They were presented possible scenarios of route that may be taken from a nutritional standpoint if MD desired pt to be admitted to the facility. They were opposed to consideration of ELÍAS, and verbalized desire for pt to return home. They were educated that ST cannot ensure safety with PO intake at this time and that if aspiration occurs, pneumonia, respiratory failure, and even death could occur. They verbalized understanding. They were educated that, considering that no ELÍAS are desired at this time, the safest PO diet at home would be that of a pureed diet consistency with honey thick liquids, again knowing the risks for aspiration. The daughter stated that they do thicken the pt's liquids at home, yet was uncertain of the consistency and did eventually say that the pt's liquids have been more consistent with that of a nectar thick consistency. It was encouraged that a \"runny pureed\" be provided, as opposed to dry, significantly thick pureed trials, as the pt likely presents with severe if not profound pharyngeal weakness, which could then result in difficulty with residue clearance if the pureed is not that of a runny pureed consistency. Proper oral care techniques were also reviewed, which included frequent swabbing of the oral cavity with a water moistened toothette, brushing the pt's teeth with a dry or dampened toothbrush and toothpaste and then swabbing out excess residue. They were provided multiple toothettes, which can be utilized at home for " oral care.  -TM       Pharyngeal Phase Concerns    Pharyngeal Phase Concerns  cough  -TM    Cough  other (see comments) with PO intake, per pt family   -TM       Clinical Impression    SLP Swallowing Diagnosis  suspected pharyngeal dysfunction;other (see comments) suspected significant oral dysfunction  -TM    Functional Impact  risk of aspiration/pneumonia;risk of malnutrition;risk of dehydration  -TM    Rehab Potential/Prognosis, Swallowing  re-evaluate goals as necessary  -TM    Swallow Criteria for Skilled Therapeutic Interventions Met  demonstrates skilled criteria  -TM       Recommendations    SLP Diet Recommendation  NPO;long term alternate methods of nutrition/hydration;other (see comments) ELÍAS not desired; therefore, pureed with honey thick liquids  -TM    Recommended Precautions and Strategies  upright posture during/after eating;small bites of food and sips of liquid;other (see comments) Knowing risks for aspiration  -TM    SLP Rec. for Method of Medication Administration  meds crushed;with pudding or applesauce;with thick liquids  -TM    Monitor for Signs of Aspiration  yes;cough;gurgly voice;throat clearing;pneumonia;right lower lobe infiltrates  -TM    Anticipated Dischage Disposition  home with home health  -TM      User Key  (r) = Recorded By, (t) = Taken By, (c) = Cosigned By    Initials Name Provider Type    TM Albina Hansen CCC-SLP Speech and Language Pathologist                                OP SLP Assessment/Plan - 11/19/19 1606        SLP Assessment    Functional Problems  Swallowing   -TM    Impact on Function: Swallowing  Risk of malnourishment;Risk of dehydration;Risk of aspiration;Risk of pneumonia   -TM    Clinical Impression: Swallowing  Severe:;Profound:;oropharyngeal phase dysphagia   -TM    Please refer to paper survey for additional self-reported information  No   -TM    Please refer to items scanned into chart for additional diagnostic informaiton and handouts as provided by  clinician  No   -TM    SLP Diagnosis  Oropharyngeal dysphagia   -TM    Prognosis  Poor (comment)   -TM    Patient/caregiver participated in establishment of treatment plan and goals  Yes   -TM    Patient would benefit from skilled therapy intervention  Yes   -TM       SLP Plan    Frequency  Eval only   -TM    Duration  Eval only   -TM    Planned CPT's?  SLP CLINICAL SWALLOW EVAL: 21699   -TM    Plan Comments  MD to order home health SLP services for continued dysphagia management.    -TM      User Key  (r) = Recorded By, (t) = Taken By, (c) = Cosigned By    Initials Name Provider Type    TM Albina Hansen CCC-SLP Speech and Language Pathologist                 Time Calculation:   SLP Start Time: 1445  SLP Stop Time: 1600  SLP Time Calculation (min): 75 min             OP SLP Discharge Summary  Date of Discharge: 11/19/19  Reason for Discharge: other (see comments)(Eval only)  Progress Toward Achieving Short/long Term Goals: other (see comments)(Eval)  Discharge Destination: home w/ home health  Discharge Instructions: MD to order home health SLP services for continued dysphagia management.       Albina Hansen CCC-SLP  11/19/2019

## 2019-11-20 ENCOUNTER — TRANSCRIBE ORDERS (OUTPATIENT)
Dept: SPEECH THERAPY | Facility: HOSPITAL | Age: 79
End: 2019-11-20

## 2019-11-20 DIAGNOSIS — R13.10 DYSPHAGIA, UNSPECIFIED TYPE: Primary | ICD-10-CM
